# Patient Record
Sex: MALE | Race: WHITE | Employment: FULL TIME | URBAN - METROPOLITAN AREA
[De-identification: names, ages, dates, MRNs, and addresses within clinical notes are randomized per-mention and may not be internally consistent; named-entity substitution may affect disease eponyms.]

---

## 2016-07-20 LAB — COLONOSCOPY, EXTERNAL: NORMAL

## 2017-03-27 ENCOUNTER — OFFICE VISIT (OUTPATIENT)
Dept: INTERNAL MEDICINE CLINIC | Age: 52
End: 2017-03-27

## 2017-03-27 VITALS
DIASTOLIC BLOOD PRESSURE: 89 MMHG | SYSTOLIC BLOOD PRESSURE: 129 MMHG | HEART RATE: 95 BPM | TEMPERATURE: 97.9 F | OXYGEN SATURATION: 95 % | BODY MASS INDEX: 30.93 KG/M2 | HEIGHT: 74 IN | WEIGHT: 241 LBS

## 2017-03-27 DIAGNOSIS — R07.9 CHEST PAIN IN ADULT: ICD-10-CM

## 2017-03-27 DIAGNOSIS — R00.2 HEART PALPITATIONS: ICD-10-CM

## 2017-03-27 DIAGNOSIS — I10 ESSENTIAL HYPERTENSION: Primary | ICD-10-CM

## 2017-03-27 DIAGNOSIS — M62.08 RECTUS DIASTASIS: ICD-10-CM

## 2017-03-27 RX ORDER — TIZANIDINE HYDROCHLORIDE 4 MG/1
4 CAPSULE, GELATIN COATED ORAL 3 TIMES DAILY
COMMUNITY
End: 2018-03-29

## 2017-03-27 NOTE — PROGRESS NOTES
HISTORY OF PRESENT ILLNESS  William Perdue is a 45 y.o. male. 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  metoprolol succinate (TOPROL-XL) 25 mg XL tablet Take two 25 mg tablets daily 180 Tab 3    amLODIPine (NORVASC) 5 mg tablet Take 1 Tab by mouth daily. 90 Tab 3    LORazepam (ATIVAN) 0.5 mg tablet Take 1 Tab by mouth every four (4) hours as needed (chest pain). Max Daily Amount: 3 mg. 60 Tab 1    acetaminophen (TYLENOL EXTRA STRENGTH) 500 mg tablet Take  by mouth every six (6) hours as needed for Pain.  calcium carbonate (TUMS) 200 mg calcium (500 mg) chew Take 1 Tab by mouth as needed.  simethicone (MYLICON) 80 mg chewable tablet Take 80 mg by mouth every six (6) hours as needed for Flatulence. Allergies   Allergen Reactions    Iodinated Contrast Media - Oral And Iv Dye Other (comments)     \"resp reaction\"    Pcn [Penicillins] Unknown (comments)    Stings [Sting, Bee] Swelling           ROS    Physical Exam   Constitutional: He appears well-developed and well-nourished. No distress. Appears stated age   HENT:   Head: Normocephalic. Cardiovascular: Normal rate and regular rhythm. Exam reveals no gallop and no friction rub.     No murmur heard.  Pulmonary/Chest: Effort normal and breath sounds normal.   Abdominal: Soft. Rectus diastasis with valsalva   Musculoskeletal: He exhibits no edema. Neurological: He is alert. Psychiatric: He has a normal mood and affect. Nursing note and vitals reviewed. ASSESSMENT and PLAN  Asuncion Ortiz was seen today for hypertension, palpitations and abdominal pain. Diagnoses and all orders for this visit:    Essential hypertension   Controlled, continue medicines    Chest pain in adult   Possible d/t to rectus problem   To see surgeon this week ? Surgery vs work limitation   May need FMLA?     Rectus diastasis    Heart palpitations   Controlled on toprol    Screening   Declines PSA and Hep C screening today but will consider at next visit in 6 months      Follow-up Disposition:  Return in about 6 months (around 9/27/2017) for chest pain, upper abdomen pain, htn palpitaions, PSA.

## 2017-03-27 NOTE — MR AVS SNAPSHOT
Visit Information Date & Time Provider Department Dept. Phone Encounter #  
 3/27/2017 11:30 AM Jessica Lowery, [address] [phone] [id number] Follow-up Instructions Return in about 6 months (around 9/27/2017) for chest pain, upper abdomen pain, htn palpitaions, PSA. Your Appointments 3/31/2017  9:00 AM  
New Patient with Margaret Strickland MD  
[facility] Dr. Erickson Gonzalez ([facility]) Appt Note: hernia, healthkeepers yts open access/Dr. Bruno Liu, Notes in cc. Patient will be coming from Dr. Susana valadez's; .  
 [address] [place] [address], [facility] [facility], [address] [phone]  
[address] [facility], [address] [facility] [address] [phone] Upcoming Health Maintenance Date Due Hepatitis C Screening 1965 DTaP/Tdap/Td series (1 - Tdap) 1/7/1984 INFLUENZA AGE 9 TO ADULT 8/1/2016 COLONOSCOPY 7/20/2026 Allergies as of 3/27/2017  Review Complete On: 3/27/2017 By: Ana Collins LPN Severity Noted Reaction Type Reactions Iodinated Contrast Media - Oral And Iv Dye High 03/24/2016    Other (comments) \"resp reaction\" Pcn [Penicillins]  04/09/2015    Unknown (comments) Stings [Sting, Bee]  04/19/2016    Swelling Current Immunizations  Never Reviewed No immunizations on file. Not reviewed this visit You Were Diagnosed With   
  
 Codes Comments Essential hypertension    -  Primary ICD-10-CM: I10 
ICD-9-CM: 401.9 Chest pain in adult     ICD-10-CM: R07.9 ICD-9-CM: 786.50 Rectus diastasis     ICD-10-CM: M62.08 
ICD-9-CM: 728.84 Heart palpitations     ICD-10-CM: R00.2 ICD-9-CM: 785.1 Vitals BP Pulse Temp Height(growth percentile) Weight(growth percentile) SpO2  
 129/89 (BP 1 Location: Left arm, BP Patient Position: Sitting) 95 97.9 °F (36.6 °C) (Oral) 6' 2\" (1.88 m) 241 lb (109.3 kg) 95% BMI Smoking Status 30.94 kg/m2 Former Smoker BMI and BSA Data Body Mass Index Body Surface Area 30.94 kg/m 2 2.39 m 2 Preferred Pharmacy Pharmacy Name Phone [facility] [address] [facility] [phone] Your Updated Medication List  
  
   
This list is accurate as of: 3/27/17 12:13 PM.  Always use your most recent med list. amLODIPine 5 mg tablet Commonly known as:  Voncile Gambrills Take 1 Tab by mouth daily. calcium carbonate 200 mg calcium (500 mg) Chew Commonly known as:  TUMS Take 1 Tab by mouth as needed. LORazepam 0.5 mg tablet Commonly known as:  ATIVAN Take 1 Tab by mouth every four (4) hours as needed (chest pain). Max Daily Amount: 3 mg.  
  
 metoprolol succinate 25 mg XL tablet Commonly known as:  TOPROL-XL Take two 25 mg tablets daily  
  
 simethicone 80 mg chewable tablet Commonly known as:  Honora Nader Take 80 mg by mouth every six (6) hours as needed for Flatulence. tiZANidine 4 mg capsule Commonly known as:  Stephanie Chaudhry Take 4 mg by mouth three (3) times daily. TYLENOL EXTRA STRENGTH 500 mg tablet Generic drug:  acetaminophen Take  by mouth every six (6) hours as needed for Pain. Follow-up Instructions Return in about 6 months (around 9/27/2017) for chest pain, upper abdomen pain, htn palpitaions, PSA. Introducing \[facility]\"" & HEALTH SERVICES! Dear Seth Reyna: Thank you for requesting a duuin account. Our records indicate that you already have an active duuin account. You can access your account anytime at [email or web address]/Azteq Mobile Did you know that you can access your hospital and ER discharge instructions at any time in duuin? You can also review all of your test results from your hospital stay or ER visit. Additional Information If you have questions, please visit the Frequently Asked Questions section of the [employer] website at [email or web address] Remember, [employer] is NOT to be used for urgent needs. For medical emergencies, dial 911. Now available from your iPhone and Android! Please provide this summary of care documentation to your next provider. Your primary care clinician is listed as Niurka TEJEDA. If you have any questions after today's visit, please call [phone].

## 2017-03-31 ENCOUNTER — OFFICE VISIT (OUTPATIENT)
Dept: SURGERY | Age: 52
End: 2017-03-31

## 2017-03-31 VITALS
BODY MASS INDEX: 30.74 KG/M2 | DIASTOLIC BLOOD PRESSURE: 77 MMHG | HEART RATE: 78 BPM | SYSTOLIC BLOOD PRESSURE: 132 MMHG | OXYGEN SATURATION: 96 % | HEIGHT: 74 IN | WEIGHT: 239.5 LBS

## 2017-03-31 DIAGNOSIS — M62.08 DIASTASIS RECTI: Primary | ICD-10-CM

## 2017-03-31 DIAGNOSIS — R07.9 CHEST PAIN IN ADULT: ICD-10-CM

## 2017-03-31 NOTE — MR AVS SNAPSHOT
Visit Information Date & Time Provider Department Dept. Phone Encounter #  
 3/31/2017  9:00 AM Sharee Valiente MD [facility] [id number] Your Appointments 9/28/2017  3:00 PM  
ROUTINE CARE with Mitzi Ruggiero, [address] [place] [address],[address] [facility]) Appt Note: 6 month follow up  
 [address] [facility] [address] [phone]  
[address] [facility] [address] [place] [address] Upcoming Health Maintenance Date Due Hepatitis C Screening 1965 DTaP/Tdap/Td series (1 - Tdap) 1/7/1984 INFLUENZA AGE 9 TO ADULT 8/1/2016 COLONOSCOPY 7/20/2026 Allergies as of 3/31/2017  Review Complete On: 3/31/2017 By: Sharee Valiente MD  
  
 Severity Noted Reaction Type Reactions Iodinated Contrast Media - Oral And Iv Dye High 03/24/2016    Other (comments) \"resp reaction\" Pcn [Penicillins]  04/09/2015    Unknown (comments) Stings [Sting, Bee]  04/19/2016    Swelling Current Immunizations  Never Reviewed No immunizations on file. Not reviewed this visit Vitals BP Pulse Height(growth percentile) Weight(growth percentile) SpO2 BMI  
 132/77 (BP 1 Location: Right arm, BP Patient Position: Sitting) 78 6' 2\" (1.88 m) 239 lb 8 oz (108.6 kg) 96% 30.75 kg/m2 Smoking Status Former Smoker BMI and BSA Data Body Mass Index Body Surface Area 30.75 kg/m 2 2.38 m 2 Preferred Pharmacy Pharmacy Name Phone [address] [phone] Your Updated Medication List  
  
   
This list is accurate as of: 3/31/17  1:50 PM.  Always use your most recent med list. amLODIPine 5 mg tablet Commonly known as:  Madalyn Fraction Take 1 Tab by mouth daily. calcium carbonate 200 mg calcium (500 mg) Chew Commonly known as:  TUMS Take 1 Tab by mouth as needed. LORazepam 0.5 mg tablet Commonly known as:  ATIVAN Take 1 Tab by mouth every four (4) hours as needed (chest pain). Max Daily Amount: 3 mg.  
  
 metoprolol succinate 25 mg XL tablet Commonly known as:  TOPROL-XL Take two 25 mg tablets daily  
  
 simethicone 80 mg chewable tablet Commonly known as:  Alfrieda Rodríguez Take 80 mg by mouth every six (6) hours as needed for Flatulence. tiZANidine 4 mg capsule Commonly known as:  [place] Sleek Take 4 mg by mouth three (3) times daily. TYLENOL EXTRA STRENGTH 500 mg tablet Generic drug:  acetaminophen Take  by mouth every six (6) hours as needed for Pain. Introducing [facility] & HEALTH SERVICES! Dear Zuleyma Loza: Thank you for requesting a [employer] account. Our records indicate that you already have an active [employer] account. You can access your account anytime at [email or web address] Did you know that you can access your hospital and ER discharge instructions at any time in [employer] Education? You can also review all of your test results from your hospital stay or ER visit. Additional Information If you have questions, please visit the Frequently Asked Questions section of the [employer] website at [email or web address] Remember, [employer] is NOT to be used for urgent needs. For medical emergencies, dial 911. Now available from your iPhone and Android! Please provide this summary of care documentation to your next provider. Your primary care clinician is listed as Sergio TEJEDA. If you have any questions after today's visit, please call [phone].

## 2017-03-31 NOTE — PROGRESS NOTES
HISTORY OF PRESENT ILLNESS  Xin Bennett is a 45 y.o. male who comes in for consultation by Norman Lauren MD for a upper abdominal/chest wall pain  HPI  He has had intermittent epigastric/sternal pain associated with palpitations for about 10 months. He has been to the ER on 3 occassions. He has had extensive cardiac work up, GI (EGD/colon/US), rheumatology work up which were unrevealing except for some mild duodenitis. He has also had esophageal manometry which was negative. He was told he has a hernia and that it may be the source of his symptoms. He does heavy lifting at work which aggravates the symptoms. It is not related to eating, only with movement/lifting/straining. He denies nausea, vomiting, diarrhea, constipation, melena, hematochezia, dysuria, hematuria. He has not had abdominal surgery previously. Past Medical History:   Diagnosis Date    Cerebral aneurysm     s/p clippping    Hypertension      Past Surgical History:   Procedure Laterality Date    COLONOSCOPY N/A 7/20/2016    COLONOSCOPY performed by Valencia August [facility] at [facility] ENDOSCOPY    HX OTHER SURGICAL      cerebral anerysm clipping 1994 and 1995     Family History   Problem Relation Age of Onset    Heart Disease Father      CHB pacemaker     Social History   Substance Use Topics    Smoking status: Former Smoker     Packs/day: 1.50     Years: 30.00     Types: Cigarettes     Quit date: 3/10/2016    Smokeless tobacco: Never Used    Alcohol use Yes      Comment: rarely     Current Outpatient Prescriptions   Medication Sig    tiZANidine (ZANAFLEX) 4 mg capsule Take 4 mg by mouth three (3) times daily.  metoprolol succinate (TOPROL-XL) 25 mg XL tablet Take two 25 mg tablets daily    amLODIPine (NORVASC) 5 mg tablet Take 1 Tab by mouth daily.  LORazepam (ATIVAN) 0.5 mg tablet Take 1 Tab by mouth every four (4) hours as needed (chest pain). Max Daily Amount: 3 mg.     acetaminophen (TYLENOL EXTRA STRENGTH) 500 mg tablet Take  by mouth every six (6) hours as needed for Pain.  calcium carbonate (TUMS) 200 mg calcium (500 mg) chew Take 1 Tab by mouth as needed.  simethicone (MYLICON) 80 mg chewable tablet Take 80 mg by mouth every six (6) hours as needed for Flatulence. No current facility-administered medications for this visit. Allergies   Allergen Reactions    Iodinated Contrast Media - Oral And Iv Dye Other (comments)     \"resp reaction\"    Pcn [Penicillins] Unknown (comments)    Stings [Sting, Bee] Swelling       Review of Systems   Constitutional: Negative for chills, diaphoresis, fever, malaise/fatigue and weight loss. HENT: Negative for congestion, ear pain and sore throat. Eyes: Negative for blurred vision and pain. Respiratory: Negative for cough, hemoptysis, sputum production, shortness of breath, wheezing and stridor. Cardiovascular: Positive for chest pain. Negative for palpitations, orthopnea, claudication, leg swelling and PND. Gastrointestinal: Positive for abdominal pain. Negative for blood in stool, constipation, diarrhea, heartburn, melena, nausea and vomiting. Genitourinary: Negative for dysuria, flank pain, frequency, hematuria and urgency. Musculoskeletal: Negative for back pain, joint pain, myalgias and neck pain. Skin: Negative for itching and rash. Neurological: Negative for dizziness, tremors, focal weakness, seizures, weakness and headaches. Endo/Heme/Allergies: Negative for polydipsia. Psychiatric/Behavioral: Negative for depression and memory loss. The patient is not nervous/anxious. Visit Vitals    /77 (BP 1 Location: Right arm, BP Patient Position: Sitting)    Pulse 78    Ht 6' 2\" (1.88 m)    Wt 108.6 kg (239 lb 8 oz)    SpO2 96%    BMI 30.75 kg/m2       Physical Exam   Constitutional: He is oriented to person, place, and time. He appears well-developed and well-nourished. No distress. HENT:   Head: Normocephalic and atraumatic.    Mouth/Throat: Oropharynx is clear and moist. No oropharyngeal exudate. Eyes: Conjunctivae and EOM are normal. Pupils are equal, round, and reactive to light. No scleral icterus. Neck: Normal range of motion. Neck supple. No tracheal deviation present. No thyromegaly present. Cardiovascular: Normal rate, regular rhythm and normal heart sounds. Exam reveals no gallop and no friction rub. No murmur heard. Pulmonary/Chest: Effort normal and breath sounds normal. No stridor. No respiratory distress. He has no wheezes. He has no rales. Abdominal: Soft. Normal appearance and bowel sounds are normal. He exhibits no distension, no pulsatile liver and no mass. There is no hepatosplenomegaly. There is no tenderness. There is no rebound, no guarding, no CVA tenderness, no tenderness at McBurney's point and negative Betts's sign. No hernia. Hernia confirmed negative in the ventral area, confirmed negative in the right inguinal area and confirmed negative in the left inguinal area. Genitourinary: Testes normal. Cremasteric reflex is present. Circumcised. Musculoskeletal: Normal range of motion. He exhibits no edema or tenderness. Lymphadenopathy:     He has no cervical adenopathy. Right: No inguinal adenopathy present. Left: No inguinal adenopathy present. Neurological: He is alert and oriented to person, place, and time. No cranial nerve deficit. Coordination normal.   Skin: Skin is warm and dry. No rash noted. He is not diaphoretic. No erythema. Psychiatric: He has a normal mood and affect. His behavior is normal. Judgment and thought content normal.       ASSESSMENT and PLAN  1. Atypical chest/upper abdominal pain. The pain appears musculoskeletal in nature. Consider wearing a binder with activity  2. Rectus diastasis. I explained the anatomy and pathophysiology of the process and risks for future hernia but that it is unlikely to be the source of #1. It is primarily cosmetic.     RTC prn

## 2017-09-28 ENCOUNTER — OFFICE VISIT (OUTPATIENT)
Dept: INTERNAL MEDICINE CLINIC | Age: 52
End: 2017-09-28

## 2017-09-28 VITALS
TEMPERATURE: 98.4 F | HEART RATE: 94 BPM | SYSTOLIC BLOOD PRESSURE: 138 MMHG | WEIGHT: 247 LBS | DIASTOLIC BLOOD PRESSURE: 80 MMHG | BODY MASS INDEX: 31.7 KG/M2 | HEIGHT: 74 IN | OXYGEN SATURATION: 94 %

## 2017-09-28 DIAGNOSIS — I10 ESSENTIAL HYPERTENSION: ICD-10-CM

## 2017-09-28 DIAGNOSIS — Z12.5 PROSTATE CANCER SCREENING: ICD-10-CM

## 2017-09-28 DIAGNOSIS — M62.08 DIASTASIS RECTI: ICD-10-CM

## 2017-09-28 DIAGNOSIS — F41.9 ANXIETY: Primary | ICD-10-CM

## 2017-09-28 DIAGNOSIS — R10.9 ABDOMINAL PAIN, UNSPECIFIED LOCATION: ICD-10-CM

## 2017-09-28 RX ORDER — AMLODIPINE BESYLATE 5 MG/1
5 TABLET ORAL DAILY
Qty: 90 TAB | Refills: 3 | Status: SHIPPED | OUTPATIENT
Start: 2017-09-28 | End: 2018-10-05 | Stop reason: SDUPTHER

## 2017-09-28 RX ORDER — RANITIDINE 150 MG/1
150 TABLET, FILM COATED ORAL 2 TIMES DAILY
COMMUNITY
End: 2018-10-05 | Stop reason: SDUPTHER

## 2017-09-28 RX ORDER — METOPROLOL SUCCINATE 25 MG/1
TABLET, EXTENDED RELEASE ORAL
Qty: 180 TAB | Refills: 3 | Status: SHIPPED | OUTPATIENT
Start: 2017-09-28 | End: 2018-10-05 | Stop reason: SDUPTHER

## 2017-09-28 RX ORDER — LORAZEPAM 0.5 MG/1
0.5 TABLET ORAL
Qty: 60 TAB | Refills: 1 | Status: SHIPPED | OUTPATIENT
Start: 2017-09-28 | End: 2022-03-29 | Stop reason: ALTCHOICE

## 2017-09-28 NOTE — PROGRESS NOTES
HISTORY OF PRESENT ILLNESS  Dave Goel is a 45 y.o. male. HPI   F/u anxiety, palpitation, htn , rectus diastasis  Still has abdominal discomfort at work from lifting and bending which causes CP/abd pain and then anxiety  Takes one ativan every day at work ( 5 tabs per week)  Saw general surgeon who did not advise surgery for the rectus diastasis    Patient Active Problem List    Diagnosis Date Noted    Diastasis recti 03/31/2017    Essential hypertension 10/18/2016    Chest pain in adult 10/18/2016    Thyroid nodule 10/04/2016    Heart palpitations 03/24/2016     Current Outpatient Prescriptions   Medication Sig Dispense Refill    raNITIdine (ZANTAC) 150 mg tablet Take 150 mg by mouth two (2) times a day.  LORazepam (ATIVAN) 0.5 mg tablet Take 1 Tab by mouth every four (4) hours as needed (chest pain). Max Daily Amount: 3 mg. 60 Tab 1    amLODIPine (NORVASC) 5 mg tablet Take 1 Tab by mouth daily. 90 Tab 3    metoprolol succinate (TOPROL-XL) 25 mg XL tablet Take two 25 mg tablets daily 180 Tab 3    tiZANidine (ZANAFLEX) 4 mg capsule Take 4 mg by mouth three (3) times daily.  acetaminophen (TYLENOL EXTRA STRENGTH) 500 mg tablet Take  by mouth every six (6) hours as needed for Pain.  calcium carbonate (TUMS) 200 mg calcium (500 mg) chew Take 1 Tab by mouth as needed.  simethicone (MYLICON) 80 mg chewable tablet Take 80 mg by mouth every six (6) hours as needed for Flatulence.        Allergies   Allergen Reactions    Iodinated Contrast- Oral And Iv Dye Other (comments)     \"resp reaction\"    Pcn [Penicillins] Unknown (comments)    Stings [Sting, Bee] Swelling     Social History   Substance Use Topics    Smoking status: Former Smoker     Packs/day: 1.50     Years: 30.00     Types: Cigarettes     Quit date: 3/10/2016    Smokeless tobacco: Never Used    Alcohol use Yes      Comment: rarely      Lab Results  Component Value Date/Time   WBC 11.1 09/23/2016 04:40 PM   HGB 15.1 09/23/2016 04:40 PM   HCT 43.5 09/23/2016 04:40 PM   PLATELET 166 00/61/9010 04:40 PM   MCV 91.2 09/23/2016 04:40 PM     Lab Results  Component Value Date/Time   Glucose 91 09/23/2016 04:40 PM   Creatinine 0.98 09/23/2016 04:40 PM      No results found for: CHOL, CHOLPOCT, HDL, LDL, LDLC, LDLCPOC, LDLCEXT, TRIGL, TGLPOCT, CHHD, CHHDXLab Results  Component Value Date/Time   GFR est non-AA >60 09/23/2016 04:40 PM   GFR est AA >60 09/23/2016 04:40 PM   Creatinine 0.98 09/23/2016 04:40 PM   BUN 16 09/23/2016 04:40 PM   Sodium 141 09/23/2016 04:40 PM   Potassium 3.5 09/23/2016 04:40 PM   Chloride 108 09/23/2016 04:40 PM   CO2 22 09/23/2016 04:40 PM   Magnesium 2.0 09/23/2016 04:40 PM              ROS    Physical Exam   Constitutional: He appears well-developed and well-nourished. No distress. Appears stated age   HENT:   Head: Normocephalic. Cardiovascular: Normal rate, regular rhythm and normal heart sounds. Exam reveals no gallop and no friction rub. No murmur heard. Pulmonary/Chest: Effort normal and breath sounds normal. No respiratory distress. He has no wheezes. He has no rales. He exhibits no tenderness. Abdominal: Soft. Musculoskeletal: He exhibits no edema. Neurological: He is alert. Psychiatric: He has a normal mood and affect. Nursing note and vitals reviewed. ASSESSMENT and PLAN  Diagnoses and all orders for this visit:    1. Anxiety   Stable, refilled ativan  2. Abdominal pain, unspecified location   Stable, occurs mostly at work-using binder at work now  3. Essential hypertension  -     METABOLIC PANEL, BASIC  -     LIPID PANEL  -     CBC W/O DIFF   Reasonable control  4. Prostate cancer screening  -     PROSTATE SPECIFIC AG    5. Diastasis recti   Stable, wearing abdominal binder at work now  Other orders  -     LORazepam (ATIVAN) 0.5 mg tablet; Take 1 Tab by mouth every four (4) hours as needed (chest pain). Max Daily Amount: 3 mg.  -     amLODIPine (NORVASC) 5 mg tablet;  Take 1 Tab by mouth daily.  -     metoprolol succinate (TOPROL-XL) 25 mg XL tablet; Take two 25 mg tablets daily      Follow-up Disposition:  Return in about 6 months (around 3/28/2018) for htn anxiety.

## 2017-09-28 NOTE — MR AVS SNAPSHOT
Visit Information Date & Time Provider Department Dept. Phone Encounter #  
 9/28/2017  3:00 PM Carlos Miller, [address] [place] [address],[address] [phone] [id number] Follow-up Instructions Return in about 6 months (around 3/28/2018) for htn anxiety. Upcoming Health Maintenance Date Due Hepatitis C Screening 1965 DTaP/Tdap/Td series (1 - Tdap) 1/7/1984 INFLUENZA AGE 9 TO ADULT 8/1/2017 COLONOSCOPY 7/20/2026 Allergies as of 9/28/2017  Review Complete On: 9/28/2017 By: Carlos Miller MD  
  
 Severity Noted Reaction Type Reactions Iodinated Contrast- Oral And Iv Dye High 03/24/2016    Other (comments) \"resp reaction\" Pcn [Penicillins]  04/09/2015    Unknown (comments) Stings [Sting, Bee]  04/19/2016    Swelling Current Immunizations  Never Reviewed No immunizations on file. Not reviewed this visit You Were Diagnosed With   
  
 Codes Comments Anxiety    -  Primary ICD-10-CM: F41.9 ICD-9-CM: 300.00 Abdominal pain, unspecified location     ICD-10-CM: R10.9 ICD-9-CM: 789.00 Essential hypertension     ICD-10-CM: I10 
ICD-9-CM: 401.9 Prostate cancer screening     ICD-10-CM: Z12.5 ICD-9-CM: V76.44 Diastasis recti     ICD-10-CM: M62.08 
ICD-9-CM: 728.84 Vitals BP Pulse Temp Height(growth percentile) Weight(growth percentile) SpO2  
 138/80 94 98.4 °F (36.9 °C) (Oral) 6' 2\" (1.88 m) 247 lb (112 kg) 94% BMI Smoking Status 31.71 kg/m2 Former Smoker Vitals History BMI and BSA Data Body Mass Index Body Surface Area 31.71 kg/m 2 2.42 m 2 Preferred Pharmacy Pharmacy Name Phone [address] [phone] Your Updated Medication List  
  
   
This list is accurate as of: 9/28/17  4:07 PM.  Always use your most recent med list. amLODIPine 5 mg tablet Commonly known as:  Claudia Cardona Take 1 Tab by mouth daily. calcium carbonate 200 mg calcium (500 mg) Chew Commonly known as:  TUMS Take 1 Tab by mouth as needed. LORazepam 0.5 mg tablet Commonly known as:  ATIVAN Take 1 Tab by mouth every four (4) hours as needed (chest pain). Max Daily Amount: 3 mg.  
  
 metoprolol succinate 25 mg XL tablet Commonly known as:  TOPROL-XL Take two 25 mg tablets daily  
  
 simethicone 80 mg chewable tablet Commonly known as:  Sheral Alexandra Take 80 mg by mouth every six (6) hours as needed for Flatulence. tiZANidine 4 mg capsule Commonly known as:  Hosmeche Munda Take 4 mg by mouth three (3) times daily. TYLENOL EXTRA STRENGTH 500 mg tablet Generic drug:  acetaminophen Take  by mouth every six (6) hours as needed for Pain. ZANTAC 150 mg tablet Generic drug:  raNITIdine Take 150 mg by mouth two (2) times a day. Prescriptions Printed Refills LORazepam (ATIVAN) 0.5 mg tablet 1 Sig: Take 1 Tab by mouth every four (4) hours as needed (chest pain). Max Daily Amount: 3 mg. Class: Print Route: Oral  
  
Prescriptions Sent to Pharmacy Refills  
 amLODIPine (NORVASC) 5 mg tablet 3 Sig: Take 1 Tab by mouth daily. Class: Normal  
 Pharmacy: [address] Ph #: [phone] Route: Oral  
 metoprolol succinate (TOPROL-XL) 25 mg XL tablet 3 Sig: Take two 25 mg tablets daily Class: Normal  
 Pharmacy: [address] Ph #: [phone] We Performed the Following CBC W/O DIFF [35205 CPT(R)] LIPID PANEL [01166 CPT(R)] METABOLIC PANEL, BASIC [72146 CPT(R)] PSA, DIAGNOSTIC (PROSTATE SPECIFIC AG) [id number] CPT(R)] Follow-up Instructions Return in about 6 months (around 3/28/2018) for htn anxiety. Introducing [facility] & HEALTH SERVICES! Dear Shruthi Britton: Thank you for requesting a AdFinance account. Our records indicate that you already have an active AdFinance account. You can access your account anytime at [email or web address] Did you know that you can access your hospital and ER discharge instructions at any time in AdFinance? You can also review all of your test results from your hospital stay or ER visit. Additional Information If you have questions, please visit the Frequently Asked Questions section of the AdFinance website at [email or web address] Remember, AdFinance is NOT to be used for urgent needs. For medical emergencies, dial 911. Now available from your iPhone and Android! Please provide this summary of care documentation to your next provider. Your primary care clinician is listed as David TEJEDA. If you have any questions after today's visit, please call [phone].

## 2017-09-29 LAB
BUN SERPL-MCNC: 17 MG/DL (ref 6–24)
BUN/CREAT SERPL: 18 (ref 9–20)
CALCIUM SERPL-MCNC: 9.7 MG/DL (ref 8.7–10.2)
CHLORIDE SERPL-SCNC: 105 MMOL/L (ref 96–106)
CHOLEST SERPL-MCNC: 207 MG/DL (ref 100–199)
CO2 SERPL-SCNC: 21 MMOL/L (ref 18–29)
CREAT SERPL-MCNC: 0.97 MG/DL (ref 0.76–1.27)
ERYTHROCYTE [DISTWIDTH] IN BLOOD BY AUTOMATED COUNT: 13.6 % (ref 12.3–15.4)
GLUCOSE SERPL-MCNC: 96 MG/DL (ref 65–99)
HCT VFR BLD AUTO: 43.8 % (ref 37.5–51)
HDLC SERPL-MCNC: 40 MG/DL
HGB BLD-MCNC: 15.2 G/DL (ref 12.6–17.7)
LDLC SERPL CALC-MCNC: 135 MG/DL (ref 0–99)
MCH RBC QN AUTO: 31.8 PG (ref 26.6–33)
MCHC RBC AUTO-ENTMCNC: 34.7 G/DL (ref 31.5–35.7)
MCV RBC AUTO: 92 FL (ref 79–97)
PLATELET # BLD AUTO: 322 X10E3/UL (ref 150–379)
POTASSIUM SERPL-SCNC: 4.6 MMOL/L (ref 3.5–5.2)
PSA SERPL-MCNC: 0.9 NG/ML (ref 0–4)
RBC # BLD AUTO: 4.78 X10E6/UL (ref 4.14–5.8)
SODIUM SERPL-SCNC: 144 MMOL/L (ref 134–144)
TRIGL SERPL-MCNC: 161 MG/DL (ref 0–149)
VLDLC SERPL CALC-MCNC: 32 MG/DL (ref 5–40)
WBC # BLD AUTO: 11.5 X10E3/UL (ref 3.4–10.8)

## 2017-10-03 ENCOUNTER — TELEPHONE (OUTPATIENT)
Dept: INTERNAL MEDICINE CLINIC | Age: 52
End: 2017-10-03

## 2017-10-13 ENCOUNTER — TELEPHONE (OUTPATIENT)
Dept: INTERNAL MEDICINE CLINIC | Age: 52
End: 2017-10-13

## 2018-03-29 ENCOUNTER — OFFICE VISIT (OUTPATIENT)
Dept: INTERNAL MEDICINE CLINIC | Age: 53
End: 2018-03-29

## 2018-03-29 VITALS
HEIGHT: 74 IN | WEIGHT: 251 LBS | SYSTOLIC BLOOD PRESSURE: 140 MMHG | HEART RATE: 87 BPM | BODY MASS INDEX: 32.21 KG/M2 | OXYGEN SATURATION: 95 % | TEMPERATURE: 98.4 F | DIASTOLIC BLOOD PRESSURE: 84 MMHG

## 2018-03-29 DIAGNOSIS — M62.08 RECTUS DIASTASIS: ICD-10-CM

## 2018-03-29 DIAGNOSIS — I10 ESSENTIAL HYPERTENSION: Primary | ICD-10-CM

## 2018-03-29 RX ORDER — CYCLOBENZAPRINE HCL 5 MG
5 TABLET ORAL
Qty: 60 TAB | Refills: 5 | Status: SHIPPED | OUTPATIENT
Start: 2018-03-29 | End: 2018-10-05

## 2018-03-29 NOTE — PROGRESS NOTES
HISTORY OF PRESENT ILLNESS  Valentina Martin is a 52 y.o. male. 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 Asking about a testosterone level in the future    Patient Active Problem List    Diagnosis Date Noted    Diastasis recti 03/31/2017    Essential hypertension 10/18/2016    Chest pain in adult 10/18/2016    Thyroid nodule 10/04/2016    Heart palpitations 03/24/2016     Current Outpatient Prescriptions   Medication Sig Dispense Refill    cyclobenzaprine (FLEXERIL) 5 mg tablet Take 1 Tab by mouth three (3) times daily as needed for Muscle Spasm(s). 60 Tab 5    raNITIdine (ZANTAC) 150 mg tablet Take 150 mg by mouth two (2) times a day.  LORazepam (ATIVAN) 0.5 mg tablet Take 1 Tab by mouth every four (4) hours as needed (chest pain). Max Daily Amount: 3 mg. 60 Tab 1    amLODIPine (NORVASC) 5 mg tablet Take 1 Tab by mouth daily. 90 Tab 3    metoprolol succinate (TOPROL-XL) 25 mg XL tablet Take two 25 mg tablets daily 180 Tab 3    acetaminophen (TYLENOL EXTRA STRENGTH) 500 mg tablet Take  by mouth every six (6) hours as needed for Pain.  calcium carbonate (TUMS) 200 mg calcium (500 mg) chew Take 1 Tab by mouth as needed.  simethicone (MYLICON) 80 mg chewable tablet Take 80 mg by mouth every six (6) hours as needed for Flatulence.        Allergies   Allergen Reactions    Iodinated Contrast- Oral And Iv Dye Other (comments)     \"resp reaction\"    Pcn [Penicillins] Unknown (comments)    Stings [Sting, Bee] Swelling      Lab Results  Component Value Date/Time   Glucose 96 09/28/2017 04:17 PM   LDL, calculated 135 (H) 09/28/2017 04:17 PM   Creatinine 0.97 09/28/2017 04:17 PM      Lab Results  Component Value Date/Time   Cholesterol, total 207 (H) 09/28/2017 04:17 PM   HDL Cholesterol 40 09/28/2017 04:17 PM   LDL, calculated 135 (H) 09/28/2017 04:17 PM   Triglyceride 161 (H) 09/28/2017 04:17 PM     Lab Results  Component Value Date/Time   GFR est non-AA 88 09/28/2017 04:17 PM   GFR est  09/28/2017 04:17 PM   Creatinine 0.97 09/28/2017 04:17 PM   BUN 17 09/28/2017 04:17 PM   Sodium 144 09/28/2017 04:17 PM   Potassium 4.6 09/28/2017 04:17 PM   Chloride 105 09/28/2017 04:17 PM   CO2 21 09/28/2017 04:17 PM   Magnesium 2.0 09/23/2016 04:40 PM        ROS    Physical Exam   Constitutional: He appears well-developed and well-nourished. No distress. Appears stated age   HENT:   Head: Normocephalic. Cardiovascular: Normal rate, regular rhythm and normal heart sounds. Exam reveals no gallop and no friction rub. No murmur heard. Pulmonary/Chest: Effort normal and breath sounds normal. No respiratory distress. He has no wheezes. He has no rales. He exhibits no tenderness. Abdominal: Soft. Rectus diastasis   Musculoskeletal: He exhibits no edema. Neurological: He is alert. Psychiatric: He has a normal mood and affect. Nursing note and vitals reviewed. ASSESSMENT and PLAN  Diagnoses and all orders for this visit:    1. Essential hypertension   Reasonable control   Continue medicines  2. Rectus diastasis   Stable, pt tries to avoid excessive heavy lifting   Flexeril prn pain 60 plus refills   Zantac bid  Other orders  -     cyclobenzaprine (FLEXERIL) 5 mg tablet; Take 1 Tab by mouth three (3) times daily as needed for Muscle Spasm(s). Follow-up Disposition:  Return in about 6 months (around 9/29/2018) for CPE x 30 min--.

## 2018-03-29 NOTE — MR AVS SNAPSHOT
[address] 
[phone] Patient: Loreto Madrid MRN: [id number] :[id number] Visit Information Date & Time Provider Department Dept. Phone Encounter #  
 3/29/2018  3:00 PM Michial Needs, [address] [place] [address],[address] [phone] [id number] Follow-up Instructions Return in about 6 months (around 2018) for CPE x 30 min--. Upcoming Health Maintenance Date Due Hepatitis C Screening 1965 DTaP/Tdap/Td series (1 - Tdap) 1984 COLONOSCOPY 2026 Allergies as of 3/29/2018  Review Complete On: 3/29/2018 By: Jamel Garner LPN Severity Noted Reaction Type Reactions Iodinated Contrast- Oral And Iv Dye High 2016    Other (comments) \"resp reaction\" Pcn [Penicillins]  2015    Unknown (comments) Stings [Sting, Bee]  2016    Swelling Current Immunizations  Never Reviewed No immunizations on file. Not reviewed this visit You Were Diagnosed With   
  
 Codes Comments Essential hypertension    -  Primary ICD-10-CM: I10 
ICD-9-CM: 401.9 Rectus diastasis     ICD-10-CM: M62.08 
ICD-9-CM: 728.84 Vitals BP Pulse Temp Height(growth percentile) Weight(growth percentile) SpO2  
 140/84 87 98.4 °F (36.9 °C) (Oral) 6' 2\" (1.88 m) 251 lb (113.9 kg) 95% BMI Smoking Status 32.23 kg/m2 Former Smoker Vitals History BMI and BSA Data Body Mass Index Body Surface Area  
 32.23 kg/m 2 2.44 m 2 Preferred Pharmacy Pharmacy Name Phone [address] [place] [address] [phone] Your Updated Medication List  
  
   
This list is accurate as of 3/29/18  3:50 PM.  Always use your most recent med list. amLODIPine 5 mg tablet Commonly known as:  Idania Peng Take 1 Tab by mouth daily. calcium carbonate 200 mg calcium (500 mg) Chew Commonly known as:  TUMS Take 1 Tab by mouth as needed. cyclobenzaprine 5 mg tablet Commonly known as:  FLEXERIL Take 1 Tab by mouth three (3) times daily as needed for Muscle Spasm(s). LORazepam 0.5 mg tablet Commonly known as:  ATIVAN Take 1 Tab by mouth every four (4) hours as needed (chest pain). Max Daily Amount: 3 mg.  
  
 metoprolol succinate 25 mg XL tablet Commonly known as:  TOPROL-XL Take two 25 mg tablets daily  
  
 simethicone 80 mg chewable tablet Commonly known as:  Sloan Redder Take 80 mg by mouth every six (6) hours as needed for Flatulence. TYLENOL EXTRA STRENGTH 500 mg tablet Generic drug:  acetaminophen Take  by mouth every six (6) hours as needed for Pain. ZANTAC 150 mg tablet Generic drug:  raNITIdine Take 150 mg by mouth two (2) times a day. Prescriptions Sent to Pharmacy Refills  
 cyclobenzaprine (FLEXERIL) 5 mg tablet 5 Sig: Take 1 Tab by mouth three (3) times daily as needed for Muscle Spasm(s). Class: Normal  
 Pharmacy: [facility] [address], [place] Ph #: [phone] Route: Oral  
  
Follow-up Instructions Return in about 6 months (around 9/29/2018) for CPE x 30 min--. Introducing [facility] & HEALTH SERVICES! Dear Renny Sims: Thank you for requesting a CQuotient account. Our records indicate that you already have an active CQuotient account. You can access your account anytime at [email or web address] Did you know that you can access your hospital and ER discharge instructions at any time in CQuotient? You can also review all of your test results from your hospital stay or ER visit. Additional Information If you have questions, please visit the Frequently Asked Questions section of the CQuotient website at [email or web address] Remember, [employer] Computing[employer] is NOT to be used for urgent needs. For medical emergencies, dial 911. Now available from your iPhone and Android! Please provide this summary of care documentation to your next provider. Your primary care clinician is listed as Robinson TEJEDA. If you have any questions after today's visit, please call [phone].

## 2018-10-04 PROBLEM — E66.9 OBESITY (BMI 30.0-34.9): Status: ACTIVE | Noted: 2018-10-04

## 2018-10-04 PROBLEM — E78.00 PURE HYPERCHOLESTEROLEMIA: Status: ACTIVE | Noted: 2018-10-04

## 2018-10-04 NOTE — PROGRESS NOTES
HISTORY OF PRESENT ILLNESS Wendi Menon is a 52 y.o. male. HPI  
F/u HTN 
C/o right arm numbness intermitently x 4 mos. 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 Asking about a testosterone level in the future 
  
 
 
Patient Active Problem List  
 Diagnosis Date Noted  Diastasis recti 03/31/2017  Essential hypertension 10/18/2016  Chest pain in adult 10/18/2016  Thyroid nodule 10/04/2016  Heart palpitations 03/24/2016 Current Outpatient Prescriptions Medication Sig Dispense Refill  cyclobenzaprine (FLEXERIL) 5 mg tablet Take 1 Tab by mouth three (3) times daily as needed for Muscle Spasm(s). 60 Tab 5  
 raNITIdine (ZANTAC) 150 mg tablet Take 150 mg by mouth two (2) times a day.  LORazepam (ATIVAN) 0.5 mg tablet Take 1 Tab by mouth every four (4) hours as needed (chest pain). Max Daily Amount: 3 mg. 60 Tab 1  
 amLODIPine (NORVASC) 5 mg tablet Take 1 Tab by mouth daily. 90 Tab 3  
 metoprolol succinate (TOPROL-XL) 25 mg XL tablet Take two 25 mg tablets daily 180 Tab 3  
 acetaminophen (TYLENOL EXTRA STRENGTH) 500 mg tablet Take  by mouth every six (6) hours as needed for Pain.  calcium carbonate (TUMS) 200 mg calcium (500 mg) chew Take 1 Tab by mouth as needed.  simethicone (MYLICON) 80 mg chewable tablet Take 80 mg by mouth every six (6) hours as needed for Flatulence. Allergies Allergen Reactions  Iodinated Contrast- Oral And Iv Dye Other (comments) \"resp reaction\"  Pcn [Penicillins] Unknown (comments)  Stings [Sting, Bee] Swelling Lab Results Component Value Date/Time Glucose 96 09/28/2017 04:17 PM  
 LDL, calculated 135 (H) 09/28/2017 04:17 PM  
Creatinine 0.97 09/28/2017 04:17 PM  
  
Lab Results Component Value Date/Time Cholesterol, total 207 (H) 09/28/2017 04:17 PM  
HDL Cholesterol 40 09/28/2017 04:17 PM  
LDL, calculated 135 (H) 09/28/2017 04:17 PM  
Triglyceride 161 (H) 09/28/2017 04:17 PM  
 
Lab Results Component Value Date/Time GFR est non-AA 88 09/28/2017 04:17 PM  
GFR est  09/28/2017 04:17 PM  
Creatinine 0.97 09/28/2017 04:17 PM  
BUN 17 09/28/2017 04:17 PM  
Sodium 144 09/28/2017 04:17 PM  
Potassium 4.6 09/28/2017 04:17 PM  
Chloride 105 09/28/2017 04:17 PM  
CO2 21 09/28/2017 04:17 PM  
Magnesium 2.0 09/23/2016 04:40 PM  
 
Lab Results Component Value Date/Time  
Prostate Specific Ag 0.9 09/28/2017 04:17 PM  
  
 
ROS Physical Exam  
Constitutional: He appears well-developed and well-nourished. No distress. Appears stated age, obese, nad HENT:  
Head: Normocephalic. Neck:  
Positive spurlings Cardiovascular: Normal rate, regular rhythm and normal heart sounds. Exam reveals no gallop and no friction rub. No murmur heard. Pulmonary/Chest: Effort normal and breath sounds normal. No respiratory distress. He has no wheezes. He has no rales. He exhibits no tenderness. Abdominal: Soft. Musculoskeletal: He exhibits no edema. Neurological: He is alert. Neg tinels on right Intact  and b/l arm strength Psychiatric: He has a normal mood and affect. Nursing note and vitals reviewed. ASSESSMENT and PLAN Diagnoses and all orders for this visit: 1. Essential hypertension 
-     CBC W/O DIFF 
-     METABOLIC PANEL, COMPREHENSIVE 
-     LIPID PANEL 
 controlled 2. Fatigue, unspecified type 
-     TSH 3RD GENERATION 
-     TESTOSTERONE, TOTAL, ADULT MALE 3. Prostate cancer screening -     PSA SCREENING (SCREENING) () 4. Pure hypercholesterolemia -     LIPID PANEL 
-     TSH 3RD GENERATION 5. Obesity (BMI 30.0-34. 9) I have reviewed/discussed the above normal BMI with the patient. I have recommended the following interventions: dietary management education, guidance, and counseling and encourage exercise . Analilia Shah 6. Anxiety Controlled Rare use of ativan 7. Encounter for hepatitis C screening test for low risk patient 
-     HEPATITIS C AB 
 
8. Cervical radicular pain Naprosyn rx Will refer to ortho spine if symptoms worse or not improved, pt will call for referral if needed 9. GERD Controlled on zantac , refilled Other orders 
-     amLODIPine (NORVASC) 5 mg tablet; Take 1 Tab by mouth daily. -     metoprolol succinate (TOPROL-XL) 25 mg XL tablet; Take two 25 mg tablets daily 
-     raNITIdine (ZANTAC) 150 mg tablet; Take 1 Tab by mouth two (2) times a day. -     naproxen (NAPROSYN) 500 mg tablet; Take 1 Tab by mouth two (2) times daily as needed. Follow-up Disposition: 
Return in about 6 months (around 4/5/2019) for htn hld cervical radicular pain.

## 2018-10-05 ENCOUNTER — OFFICE VISIT (OUTPATIENT)
Dept: INTERNAL MEDICINE CLINIC | Age: 53
End: 2018-10-05

## 2018-10-05 VITALS
HEIGHT: 74 IN | WEIGHT: 247 LBS | OXYGEN SATURATION: 97 % | HEART RATE: 83 BPM | TEMPERATURE: 98.1 F | BODY MASS INDEX: 31.7 KG/M2 | DIASTOLIC BLOOD PRESSURE: 83 MMHG | SYSTOLIC BLOOD PRESSURE: 145 MMHG

## 2018-10-05 DIAGNOSIS — R53.83 FATIGUE, UNSPECIFIED TYPE: ICD-10-CM

## 2018-10-05 DIAGNOSIS — Z11.59 ENCOUNTER FOR HEPATITIS C SCREENING TEST FOR LOW RISK PATIENT: ICD-10-CM

## 2018-10-05 DIAGNOSIS — E78.00 PURE HYPERCHOLESTEROLEMIA: ICD-10-CM

## 2018-10-05 DIAGNOSIS — I10 ESSENTIAL HYPERTENSION: Primary | ICD-10-CM

## 2018-10-05 DIAGNOSIS — Z12.5 PROSTATE CANCER SCREENING: ICD-10-CM

## 2018-10-05 DIAGNOSIS — K21.9 GASTROESOPHAGEAL REFLUX DISEASE, ESOPHAGITIS PRESENCE NOT SPECIFIED: ICD-10-CM

## 2018-10-05 DIAGNOSIS — M54.12 CERVICAL RADICULAR PAIN: ICD-10-CM

## 2018-10-05 DIAGNOSIS — F41.9 ANXIETY: ICD-10-CM

## 2018-10-05 DIAGNOSIS — E66.9 OBESITY (BMI 30.0-34.9): ICD-10-CM

## 2018-10-05 RX ORDER — RANITIDINE 150 MG/1
150 TABLET, FILM COATED ORAL 2 TIMES DAILY
Qty: 180 TAB | Refills: 3 | Status: SHIPPED | OUTPATIENT
Start: 2018-10-05 | End: 2020-10-30 | Stop reason: ALTCHOICE

## 2018-10-05 RX ORDER — METOPROLOL SUCCINATE 25 MG/1
TABLET, EXTENDED RELEASE ORAL
Qty: 180 TAB | Refills: 3 | Status: SHIPPED | OUTPATIENT
Start: 2018-10-05 | End: 2019-09-27 | Stop reason: SDUPTHER

## 2018-10-05 RX ORDER — NAPROXEN 500 MG/1
500 TABLET ORAL
Qty: 30 TAB | Refills: 0 | Status: SHIPPED | OUTPATIENT
Start: 2018-10-05 | End: 2018-11-13 | Stop reason: SDUPTHER

## 2018-10-05 RX ORDER — AMLODIPINE BESYLATE 5 MG/1
5 TABLET ORAL DAILY
Qty: 90 TAB | Refills: 3 | Status: SHIPPED | OUTPATIENT
Start: 2018-10-05 | End: 2019-09-27 | Stop reason: SDUPTHER

## 2018-10-05 NOTE — PROGRESS NOTES
Chief Complaint Patient presents with  Hypertension 6 month follow up  Cholesterol Problem 6 month follow up  Follow-up Fatique from 6 months ago  Labs Non fasting 6 month follow up  Medication Refill  
  all RX's  Medication Evaluation Zantac and insurance coverage  Numbness Right arm

## 2018-10-05 NOTE — MR AVS SNAPSHOT
[address] 
[phone] Patient: Wendi Menon MRN: [id number] :[id number] Visit Information Date & Time Provider Department Dept. Phone Encounter #  
 10/5/2018  7:45 AM Douglas Ketans,  [facility] [address] [phone] [id number] Follow-up Instructions Return in about 6 months (around 2019) for htn hld cervical radicular pain. Upcoming Health Maintenance Date Due Hepatitis C Screening 1965 DTaP/Tdap/Td series (1 - Tdap) 1984 Shingrix Vaccine Age 50> (1 of 2) 2013 Influenza Age 5 to Adult 3/31/2019* COLONOSCOPY 2026 *Topic was postponed. The date shown is not the original due date. Allergies as of 10/5/2018  Review Complete On: 10/5/2018 By: Cheyenne Sarmiento LPN Severity Noted Reaction Type Reactions Iodinated Contrast- Oral And Iv Dye High 2016    Other (comments) \"resp reaction\" Pcn [Penicillins]  2015    Unknown (comments) Stings [Sting, Bee]  2016    Swelling Current Immunizations  Never Reviewed No immunizations on file. Not reviewed this visit You Were Diagnosed With   
  
 Codes Comments Essential hypertension    -  Primary ICD-10-CM: I10 
ICD-9-CM: 401.9 Fatigue, unspecified type     ICD-10-CM: R53.83 ICD-9-CM: 780.79 Prostate cancer screening     ICD-10-CM: Z12.5 ICD-9-CM: V76.44 Pure hypercholesterolemia     ICD-10-CM: E78.00 ICD-9-CM: 272.0 Obesity (BMI 30.0-34.9)     ICD-10-CM: V83.1 ICD-9-CM: 278.00 Anxiety     ICD-10-CM: F41.9 ICD-9-CM: 300.00 Encounter for hepatitis C screening test for low risk patient     ICD-10-CM: Z11.59 
ICD-9-CM: V73.89 Cervical radicular pain     ICD-10-CM: M54.12 
ICD-9-CM: 723.4 Vitals BP Pulse Temp Height(growth percentile) Weight(growth percentile) SpO2 145/83 (BP 1 Location: Left arm, BP Patient Position: Sitting) 83 98.1 °F (36.7 °C) (Oral) 6' 2\" (1.88 m) 247 lb (112 kg) 97% BMI Smoking Status 31.71 kg/m2 Former Smoker BMI and BSA Data Body Mass Index Body Surface Area 31.71 kg/m 2 2.42 m 2 Preferred Pharmacy Pharmacy Name Phone [address] [place] [address] [phone] Your Updated Medication List  
  
   
This list is accurate as of 10/5/18  8:10 AM.  Always use your most recent med list. amLODIPine 5 mg tablet Commonly known as:  Aida Greening Take 1 Tab by mouth daily. calcium carbonate 200 mg calcium (500 mg) Chew Commonly known as:  TUMS Take 1 Tab by mouth as needed. cyclobenzaprine 5 mg tablet Commonly known as:  FLEXERIL Take 1 Tab by mouth three (3) times daily as needed for Muscle Spasm(s). LORazepam 0.5 mg tablet Commonly known as:  ATIVAN Take 1 Tab by mouth every four (4) hours as needed (chest pain). Max Daily Amount: 3 mg.  
  
 metoprolol succinate 25 mg XL tablet Commonly known as:  TOPROL-XL Take two 25 mg tablets daily  
  
 naproxen 500 mg tablet Commonly known as:  NAPROSYN Take 1 Tab by mouth two (2) times daily as needed. raNITIdine 150 mg tablet Commonly known as:  ZANTAC Take 1 Tab by mouth two (2) times a day. simethicone 80 mg chewable tablet Commonly known as:  Viola Cost Take 80 mg by mouth every six (6) hours as needed for Flatulence. TYLENOL EXTRA STRENGTH 500 mg tablet Generic drug:  acetaminophen Take  by mouth every six (6) hours as needed for Pain. Prescriptions Sent to Pharmacy Refills  
 amLODIPine (NORVASC) 5 mg tablet 3 Sig: Take 1 Tab by mouth daily. Class: Normal  
 Pharmacy: [facility] [address], [place] Ph #: [phone]  Route: Oral  
 metoprolol succinate (TOPROL-XL) 25 mg XL tablet 3 Sig: Take two 25 mg tablets daily Class: Normal  
 Pharmacy: [facility] [address], [place] Ph #: [phone]  
 raNITIdine (ZANTAC) 150 mg tablet 3 Sig: Take 1 Tab by mouth two (2) times a day. Class: Normal  
 Pharmacy: [facility] [address], [place] Ph #: [phone] Route: Oral  
 naproxen (NAPROSYN) 500 mg tablet 0 Sig: Take 1 Tab by mouth two (2) times daily as needed. Class: Normal  
 Pharmacy: [facility] [address], [place] Ph #: [phone] Route: Oral  
  
We Performed the Following CBC W/O DIFF [34096 CPT(R)] HEPATITIS C AB [63701 CPT(R)] LIPID PANEL [21896 CPT(R)] METABOLIC PANEL, COMPREHENSIVE [18763 CPT(R)] PSA SCREENING (SCREENING) [ [facility]] TESTOSTERONE, TOTAL, ADULT MALE [21305 CPT(R)] TSH 3RD GENERATION [36334 CPT(R)] Follow-up Instructions Return in about 6 months (around 4/5/2019) for htn hld cervical radicular pain. Introducing [facility] & HEALTH SERVICES! Dear Juve Omalley: Thank you for requesting a iCabbi account. Our records indicate that you already have an active iCabbi account. You can access your account anytime at [email or web address] Did you know that you can access your hospital and ER discharge instructions at any time in iCabbi? You can also review all of your test results from your hospital stay or ER visit. Additional Information If you have questions, please visit the Frequently Asked Questions section of the iCabbi website at [email or web address] Remember, iCabbi is NOT to be used for urgent needs. For medical emergencies, dial 911. Now available from your iPhone and Android! Please provide this summary of care documentation to your next provider. Your primary care clinician is listed as An TEJEDA. If you have any questions after today's visit, please call [phone].

## 2018-10-06 LAB
ALBUMIN SERPL-MCNC: 4.2 G/DL (ref 3.5–5.5)
ALBUMIN/GLOB SERPL: 1.4 {RATIO} (ref 1.2–2.2)
ALP SERPL-CCNC: 91 IU/L (ref 39–117)
ALT SERPL-CCNC: 46 IU/L (ref 0–44)
AST SERPL-CCNC: 29 IU/L (ref 0–40)
BILIRUB SERPL-MCNC: 0.3 MG/DL (ref 0–1.2)
BUN SERPL-MCNC: 15 MG/DL (ref 6–24)
BUN/CREAT SERPL: 16 (ref 9–20)
CALCIUM SERPL-MCNC: 9.4 MG/DL (ref 8.7–10.2)
CHLORIDE SERPL-SCNC: 107 MMOL/L (ref 96–106)
CHOLEST SERPL-MCNC: 208 MG/DL (ref 100–199)
CO2 SERPL-SCNC: 20 MMOL/L (ref 20–29)
CREAT SERPL-MCNC: 0.95 MG/DL (ref 0.76–1.27)
ERYTHROCYTE [DISTWIDTH] IN BLOOD BY AUTOMATED COUNT: 13.5 % (ref 12.3–15.4)
GLOBULIN SER CALC-MCNC: 2.9 G/DL (ref 1.5–4.5)
GLUCOSE SERPL-MCNC: 120 MG/DL (ref 65–99)
HCT VFR BLD AUTO: 45.1 % (ref 37.5–51)
HCV AB S/CO SERPL IA: <0.1 S/CO RATIO (ref 0–0.9)
HDLC SERPL-MCNC: 42 MG/DL
HGB BLD-MCNC: 16.3 G/DL (ref 13–17.7)
LDLC SERPL CALC-MCNC: 150 MG/DL (ref 0–99)
MCH RBC QN AUTO: 32.7 PG (ref 26.6–33)
MCHC RBC AUTO-ENTMCNC: 36.1 G/DL (ref 31.5–35.7)
MCV RBC AUTO: 90 FL (ref 79–97)
PLATELET # BLD AUTO: 314 X10E3/UL (ref 150–379)
POTASSIUM SERPL-SCNC: 4.3 MMOL/L (ref 3.5–5.2)
PROT SERPL-MCNC: 7.1 G/DL (ref 6–8.5)
PSA SERPL-MCNC: 1 NG/ML (ref 0–4)
RBC # BLD AUTO: 4.99 X10E6/UL (ref 4.14–5.8)
SODIUM SERPL-SCNC: 143 MMOL/L (ref 134–144)
TESTOST SERPL-MCNC: 737 NG/DL (ref 264–916)
TRIGL SERPL-MCNC: 80 MG/DL (ref 0–149)
TSH SERPL DL<=0.005 MIU/L-ACNC: 1.87 UIU/ML (ref 0.45–4.5)
VLDLC SERPL CALC-MCNC: 16 MG/DL (ref 5–40)
WBC # BLD AUTO: 9.5 X10E3/UL (ref 3.4–10.8)

## 2019-03-28 NOTE — PROGRESS NOTES
HISTORY OF PRESENT ILLNESS Bart Collins is a 62 y.o. male. HPI  
 
 F/u HTN HLD GERD cervical radicular pain Anxiety , Hyperglycemia Only occasionally has anxiety symptoms needing ativan prn Has rare palpitations Salads for lunch lost a few lbs Last OV 
C/o right arm numbness intermitently x 4 mos. 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 Asking about a testosterone level in the future 
  
 
 
 
Patient Active Problem List  
 Diagnosis Date Noted  Pure hypercholesterolemia 10/04/2018  Obesity (BMI 30.0-34.9) 10/04/2018  Diastasis recti 03/31/2017  Essential hypertension 10/18/2016  Chest pain in adult 10/18/2016  Thyroid nodule 10/04/2016  Heart palpitations 03/24/2016 Current Outpatient Medications Medication Sig Dispense Refill  naproxen (NAPROSYN) 500 mg tablet TAKE 1 TABLET BY MOUTH TWICE DAILY AS NEEDED 30 Tab 3  
 amLODIPine (NORVASC) 5 mg tablet Take 1 Tab by mouth daily. 90 Tab 3  
 metoprolol succinate (TOPROL-XL) 25 mg XL tablet Take two 25 mg tablets daily 180 Tab 3  
 raNITIdine (ZANTAC) 150 mg tablet Take 1 Tab by mouth two (2) times a day. 180 Tab 3  
 LORazepam (ATIVAN) 0.5 mg tablet Take 1 Tab by mouth every four (4) hours as needed (chest pain). Max Daily Amount: 3 mg. 60 Tab 1  
 acetaminophen (TYLENOL EXTRA STRENGTH) 500 mg tablet Take  by mouth every six (6) hours as needed for Pain.  calcium carbonate (TUMS) 200 mg calcium (500 mg) chew Take 1 Tab by mouth as needed. Allergies Allergen Reactions  Iodinated Contrast- Oral And Iv Dye Other (comments) \"resp reaction\"  Pcn [Penicillins] Unknown (comments)  Stings [Sting, Bee] Swelling Lab Results Component Value Date/Time WBC 9.5 10/05/2018 08:17 AM  
 HGB 16.3 10/05/2018 08:17 AM  
 HCT 45.1 10/05/2018 08:17 AM  
 PLATELET 500 70/97/3754 08:17 AM  
 MCV 90 10/05/2018 08:17 AM  
 
Lab Results Component Value Date/Time Glucose 120 (H) 10/05/2018 08:17 AM  
 LDL, calculated 150 (H) 10/05/2018 08:17 AM  
 Creatinine 0.95 10/05/2018 08:17 AM  
  
Lab Results Component Value Date/Time Cholesterol, total 208 (H) 10/05/2018 08:17 AM  
 HDL Cholesterol 42 10/05/2018 08:17 AM  
 LDL, calculated 150 (H) 10/05/2018 08:17 AM  
 Triglyceride 80 10/05/2018 08:17 AM  
 
Lab Results Component Value Date/Time GFR est non-AA 90 10/05/2018 08:17 AM  
 GFR est  10/05/2018 08:17 AM  
 Creatinine 0.95 10/05/2018 08:17 AM  
 BUN 15 10/05/2018 08:17 AM  
 Sodium 143 10/05/2018 08:17 AM  
 Potassium 4.3 10/05/2018 08:17 AM  
 Chloride 107 (H) 10/05/2018 08:17 AM  
 CO2 20 10/05/2018 08:17 AM  
 Magnesium 2.0 09/23/2016 04:40 PM  
  
ROS Physical Exam  
Constitutional: He appears well-developed and well-nourished. No distress. Appears stated age, obese, nad HENT:  
Head: Normocephalic. Cardiovascular: Normal rate, regular rhythm and normal heart sounds. Exam reveals no gallop and no friction rub. No murmur heard. Pulmonary/Chest: Effort normal and breath sounds normal. No respiratory distress. He has no wheezes. He has no rales. He exhibits no tenderness. Abdominal: Soft. Musculoskeletal: He exhibits no edema. Neurological: He is alert. Psychiatric: He has a normal mood and affect. Nursing note and vitals reviewed. ASSESSMENT and PLAN Diagnoses and all orders for this visit: 1. Essential hypertension Reasonable control-continue medicines 2. Pure hypercholesterolemia -     LIPID PANEL Consider statin -discussed 3. Elevated glucose 
-     HEMOGLOBIN A1C WITH EAG 
-     GLUCOSE, RANDOM Continue low carb diet and weight reduction 4. Gastroesophageal reflux disease, esophagitis presence not specified Controlled on zantac 5. Anxiety Prn ativan helpful Follow-up and Dispositions · Return in about 6 months (around 9/29/2019) for htn hld prediabetes.

## 2019-03-29 ENCOUNTER — OFFICE VISIT (OUTPATIENT)
Dept: INTERNAL MEDICINE CLINIC | Age: 54
End: 2019-03-29

## 2019-03-29 VITALS
TEMPERATURE: 97.7 F | RESPIRATION RATE: 14 BRPM | WEIGHT: 245 LBS | HEART RATE: 74 BPM | SYSTOLIC BLOOD PRESSURE: 142 MMHG | OXYGEN SATURATION: 96 % | HEIGHT: 74 IN | BODY MASS INDEX: 31.44 KG/M2 | DIASTOLIC BLOOD PRESSURE: 79 MMHG

## 2019-03-29 DIAGNOSIS — F41.9 ANXIETY: ICD-10-CM

## 2019-03-29 DIAGNOSIS — E78.00 PURE HYPERCHOLESTEROLEMIA: ICD-10-CM

## 2019-03-29 DIAGNOSIS — I10 ESSENTIAL HYPERTENSION: Primary | ICD-10-CM

## 2019-03-29 DIAGNOSIS — R73.09 ELEVATED GLUCOSE: ICD-10-CM

## 2019-03-29 DIAGNOSIS — K21.9 GASTROESOPHAGEAL REFLUX DISEASE, ESOPHAGITIS PRESENCE NOT SPECIFIED: ICD-10-CM

## 2019-03-29 NOTE — PROGRESS NOTES
Chief Complaint Patient presents with  Hypertension 6 month follow up  Medication Refill  
  all medications  Labs Fasting ,6 month follow up  Palpitations  
  off and on but better than in the past

## 2019-03-30 LAB
CHOLEST SERPL-MCNC: 207 MG/DL (ref 100–199)
EST. AVERAGE GLUCOSE BLD GHB EST-MCNC: 128 MG/DL
GLUCOSE SERPL-MCNC: 105 MG/DL (ref 65–99)
HBA1C MFR BLD: 6.1 % (ref 4.8–5.6)
HDLC SERPL-MCNC: 42 MG/DL
LDLC SERPL CALC-MCNC: 143 MG/DL (ref 0–99)
TRIGL SERPL-MCNC: 111 MG/DL (ref 0–149)
VLDLC SERPL CALC-MCNC: 22 MG/DL (ref 5–40)

## 2019-04-08 DIAGNOSIS — E78.00 PURE HYPERCHOLESTEROLEMIA: Primary | ICD-10-CM

## 2019-04-08 RX ORDER — ATORVASTATIN CALCIUM 10 MG/1
10 TABLET, FILM COATED ORAL DAILY
Qty: 30 TAB | Refills: 1 | Status: SHIPPED | OUTPATIENT
Start: 2019-04-08 | End: 2019-09-27 | Stop reason: SDUPTHER

## 2019-09-27 ENCOUNTER — OFFICE VISIT (OUTPATIENT)
Dept: INTERNAL MEDICINE CLINIC | Age: 54
End: 2019-09-27

## 2019-09-27 VITALS
RESPIRATION RATE: 16 BRPM | HEIGHT: 74 IN | SYSTOLIC BLOOD PRESSURE: 139 MMHG | WEIGHT: 240 LBS | TEMPERATURE: 98 F | BODY MASS INDEX: 30.8 KG/M2 | DIASTOLIC BLOOD PRESSURE: 81 MMHG | HEART RATE: 78 BPM | OXYGEN SATURATION: 94 %

## 2019-09-27 DIAGNOSIS — Z23 ENCOUNTER FOR IMMUNIZATION: ICD-10-CM

## 2019-09-27 DIAGNOSIS — R73.03 PREDIABETES: ICD-10-CM

## 2019-09-27 DIAGNOSIS — E78.00 PURE HYPERCHOLESTEROLEMIA: ICD-10-CM

## 2019-09-27 DIAGNOSIS — I10 ESSENTIAL HYPERTENSION: Primary | ICD-10-CM

## 2019-09-27 DIAGNOSIS — Z12.5 PROSTATE CANCER SCREENING: ICD-10-CM

## 2019-09-27 RX ORDER — AMLODIPINE BESYLATE 5 MG/1
5 TABLET ORAL DAILY
Qty: 90 TAB | Refills: 3 | Status: SHIPPED | OUTPATIENT
Start: 2019-09-27 | End: 2020-10-30 | Stop reason: SDUPTHER

## 2019-09-27 RX ORDER — METOPROLOL SUCCINATE 25 MG/1
TABLET, EXTENDED RELEASE ORAL
Qty: 180 TAB | Refills: 3 | Status: SHIPPED | OUTPATIENT
Start: 2019-09-27 | End: 2020-10-30 | Stop reason: SDUPTHER

## 2019-09-27 RX ORDER — ATORVASTATIN CALCIUM 10 MG/1
10 TABLET, FILM COATED ORAL DAILY
Qty: 90 TAB | Refills: 3 | Status: SHIPPED | OUTPATIENT
Start: 2019-09-27 | End: 2020-07-15

## 2019-09-27 NOTE — PATIENT INSTRUCTIONS
Office Policies    Phone calls/patient messages:            Please allow up to 24 hours for someone in the office to contact you about your call or message. Be mindful your provider may be out of the office or your message may require further review. We encourage you to use [facility] for your messages as this is a faster, more efficient way to communicate with our office                         Medication Refills:            Prescription medications require 48-72 business hours to process. We encourage you to use [facility] for your refills. For controlled medications: Please allow 72 business hours to process. Certain medications may require you to  a written prescription at our office. NO narcotic/controlled medications will be prescribed after 4pm Monday through Friday or on weekends              Form/Paperwork Completion:            Please note a $25 fee may incur for all paperwork for completed by our providers. We ask that you allow 7-10 business days. Pre-payment is due prior to picking up/faxing the completed form. You may also download your forms to [facility] to have your doctor print off.

## 2019-09-27 NOTE — PROGRESS NOTES
Chief Complaint   Patient presents with    Hypertension     6 month follow up    Cholesterol Problem     6 month follow up    Medication Evaluation     Zantac    Labs     Fasting

## 2019-09-28 LAB
ALBUMIN SERPL-MCNC: 4.2 G/DL (ref 3.5–5.5)
ALBUMIN/GLOB SERPL: 1.6 {RATIO} (ref 1.2–2.2)
ALP SERPL-CCNC: 87 IU/L (ref 39–117)
ALT SERPL-CCNC: 43 IU/L (ref 0–44)
AST SERPL-CCNC: 27 IU/L (ref 0–40)
BILIRUB SERPL-MCNC: 0.4 MG/DL (ref 0–1.2)
BUN SERPL-MCNC: 19 MG/DL (ref 6–24)
BUN/CREAT SERPL: 16 (ref 9–20)
CALCIUM SERPL-MCNC: 9.5 MG/DL (ref 8.7–10.2)
CHLORIDE SERPL-SCNC: 105 MMOL/L (ref 96–106)
CHOLEST SERPL-MCNC: 147 MG/DL (ref 100–199)
CO2 SERPL-SCNC: 21 MMOL/L (ref 20–29)
CREAT SERPL-MCNC: 1.16 MG/DL (ref 0.76–1.27)
ERYTHROCYTE [DISTWIDTH] IN BLOOD BY AUTOMATED COUNT: 12.5 % (ref 12.3–15.4)
EST. AVERAGE GLUCOSE BLD GHB EST-MCNC: 120 MG/DL
GLOBULIN SER CALC-MCNC: 2.7 G/DL (ref 1.5–4.5)
GLUCOSE SERPL-MCNC: 104 MG/DL (ref 65–99)
HBA1C MFR BLD: 5.8 % (ref 4.8–5.6)
HCT VFR BLD AUTO: 44.5 % (ref 37.5–51)
HDLC SERPL-MCNC: 41 MG/DL
HGB BLD-MCNC: 15.6 G/DL (ref 13–17.7)
LDLC SERPL CALC-MCNC: 90 MG/DL (ref 0–99)
MCH RBC QN AUTO: 31.9 PG (ref 26.6–33)
MCHC RBC AUTO-ENTMCNC: 35.1 G/DL (ref 31.5–35.7)
MCV RBC AUTO: 91 FL (ref 79–97)
PLATELET # BLD AUTO: 300 X10E3/UL (ref 150–450)
POTASSIUM SERPL-SCNC: 4.3 MMOL/L (ref 3.5–5.2)
PROT SERPL-MCNC: 6.9 G/DL (ref 6–8.5)
PSA SERPL-MCNC: 0.9 NG/ML (ref 0–4)
RBC # BLD AUTO: 4.89 X10E6/UL (ref 4.14–5.8)
REFLEX CRITERIA: NORMAL
SODIUM SERPL-SCNC: 142 MMOL/L (ref 134–144)
TRIGL SERPL-MCNC: 78 MG/DL (ref 0–149)
VLDLC SERPL CALC-MCNC: 16 MG/DL (ref 5–40)
WBC # BLD AUTO: 9.8 X10E3/UL (ref 3.4–10.8)

## 2020-01-02 ENCOUNTER — TELEPHONE (OUTPATIENT)
Dept: INTERNAL MEDICINE CLINIC | Age: 55
End: 2020-01-02

## 2020-01-02 NOTE — TELEPHONE ENCOUNTER
Patient states he's trying yo return your call again & is requesting to call back on his Cell number [phone]

## 2020-01-03 RX ORDER — METHYLPREDNISOLONE 4 MG/1
TABLET ORAL
Qty: 1 DOSE PACK | Refills: 0 | Status: SHIPPED | OUTPATIENT
Start: 2020-01-03 | End: 2020-10-30 | Stop reason: ALTCHOICE

## 2020-01-03 NOTE — TELEPHONE ENCOUNTER
Jamison Boggs MD  You 17 minutes ago (3:33 PM)     Please tell pt I escribed a medrol christine -hold naprosyn while on medrol    Routing comment      Notified pt via voicemail.

## 2020-01-03 NOTE — TELEPHONE ENCOUNTER
Called, spoke to pt. Two identifiers confirmed. Pt c/o a pinched nerve in his back. Pt currently taking naproxen for pain twice a day. Pt stated the naproxen is no longer helping with his pain. Pt would like to know if he can take more than 2 tablets a day of naproxen or if he can get a steroid pack. Notified pt I would send message to Dr. Yevgeniy Molina. Pt verbalized understanding of information discussed w/ no further questions at this time.

## 2020-03-17 ENCOUNTER — TELEPHONE (OUTPATIENT)
Dept: INTERNAL MEDICINE CLINIC | Age: 55
End: 2020-03-17

## 2020-03-17 NOTE — TELEPHONE ENCOUNTER
Caller's first and last name: n/a   Reason for call: Patient has requested to cancel his upcoming appointment for 3/27, but will still need all medications refilled. Name of medications not provided. Patient has expressed that he would like to avoid coming into the office during this virus outbreak.    Callback required yes/no and why: yes   Best contact number(s): [phone]   Details to clarify the request: n/a

## 2020-03-17 NOTE — TELEPHONE ENCOUNTER
Called, left vm for pt to return call to office. Notified pt via voicemail refills should last until September.

## 2020-07-15 DIAGNOSIS — E78.00 PURE HYPERCHOLESTEROLEMIA: ICD-10-CM

## 2020-07-15 RX ORDER — ATORVASTATIN CALCIUM 10 MG/1
TABLET, FILM COATED ORAL
Qty: 90 TAB | Refills: 0 | Status: SHIPPED | OUTPATIENT
Start: 2020-07-15 | End: 2020-10-30 | Stop reason: SDUPTHER

## 2020-09-18 ENCOUNTER — TELEPHONE (OUTPATIENT)
Dept: INTERNAL MEDICINE CLINIC | Age: 55
End: 2020-09-18

## 2020-09-18 NOTE — TELEPHONE ENCOUNTER
#[phone]   Pt states his 9-23-20 visit did not work for him due to work. Pt is off every Friday. He would like a Friday VV. Pt is asking if you can just text him an appt time and what Friday that will be. He will be fine with this. He can't hear calls at work and prefers no vm, just text if you can. If you need to leave a vm please be sure it is detailed. Thanks.

## 2020-09-18 NOTE — TELEPHONE ENCOUNTER
VV scheduled for 10/30 @ 1030 with Dr. Henrik Connolly. Pt verbalized understanding of information discussed w/ no further questions at this time.

## 2020-10-30 ENCOUNTER — VIRTUAL VISIT (OUTPATIENT)
Dept: INTERNAL MEDICINE CLINIC | Age: 55
End: 2020-10-30
Payer: COMMERCIAL

## 2020-10-30 DIAGNOSIS — E78.00 PURE HYPERCHOLESTEROLEMIA: ICD-10-CM

## 2020-10-30 DIAGNOSIS — R73.03 PREDIABETES: ICD-10-CM

## 2020-10-30 DIAGNOSIS — E78.5 HYPERLIPIDEMIA, UNSPECIFIED HYPERLIPIDEMIA TYPE: ICD-10-CM

## 2020-10-30 DIAGNOSIS — I10 ESSENTIAL HYPERTENSION: ICD-10-CM

## 2020-10-30 DIAGNOSIS — Z00.00 ROUTINE GENERAL MEDICAL EXAMINATION AT A HEALTH CARE FACILITY: Primary | ICD-10-CM

## 2020-10-30 PROCEDURE — 99443 PR PHYS/QHP TELEPHONE EVALUATION 21-30 MIN: CPT | Performed by: INTERNAL MEDICINE

## 2020-10-30 RX ORDER — AMLODIPINE BESYLATE 5 MG/1
5 TABLET ORAL DAILY
Qty: 90 TAB | Refills: 3 | Status: SHIPPED | OUTPATIENT
Start: 2020-10-30 | End: 2021-10-29 | Stop reason: SDUPTHER

## 2020-10-30 RX ORDER — ATORVASTATIN CALCIUM 10 MG/1
10 TABLET, FILM COATED ORAL DAILY
Qty: 90 TAB | Refills: 3 | Status: SHIPPED | OUTPATIENT
Start: 2020-10-30 | End: 2022-03-24

## 2020-10-30 RX ORDER — METOPROLOL SUCCINATE 25 MG/1
TABLET, EXTENDED RELEASE ORAL
Qty: 180 TAB | Refills: 3 | Status: SHIPPED | OUTPATIENT
Start: 2020-10-30 | End: 2021-10-29 | Stop reason: SDUPTHER

## 2020-10-30 NOTE — TELEPHONE ENCOUNTER
Future Appointments:  Future Appointments   Date Time Provider [place] Valverde   10/30/2020 10:30 AM Yon Case MD [facility] BS AMB        Last Appointment With Me:  Visit date not found     Requested Prescriptions     Pending Prescriptions Disp Refills    amLODIPine (NORVASC) 5 mg tablet 90 Tab 3     Sig: Take 1 Tab by mouth daily.  atorvastatin (LIPITOR) 10 mg tablet 90 Tab 3     Sig: Take 1 Tab by mouth.     metoprolol succinate (TOPROL-XL) 25 mg XL tablet 180 Tab 3     Sig: Take two 25 mg tablets daily

## 2020-10-30 NOTE — PATIENT INSTRUCTIONS
This is an established visit conducted via telemedicine. The patient has been instructed that this meets HIPAA criteria and acknowledges and agrees to this method of visitation. Randon Aase, LPN 
07/26/90 
26:30 AM 
1. Have you been to the ER, urgent care clinic since your last visit? Hospitalized since your last visit? No 
 
2. Have you seen or consulted any other health care providers outside of the [address] [place] [address] since your last visit? Include any pap smears or colon screening.  No

## 2020-10-30 NOTE — PROGRESS NOTES
HISTORY OF PRESENT ILLNESS  Vonda Joe is a 60 y.o. male. HPI     VV via telephone encounter d/t covid 19 pandemic x 21 minutes        Here for CPE and F/u HTN HLD GERD cervical radicular pain Anxiety , Hyperglycemia . palpaitations  Infrequent palpitations  bp at home was 128/84  rarely takes ativan for anxiety  Weight up a few lbs this year  Not on a strict diet  Infrequent GERd monthly at month  No neck pain  Joints more achy since starting statin  Last OV  Last a1c 6.1 LDl 143  Takes ativan prn for anxiety   lipitor was prescribed since last OV-tolerating  Weight down 5 lbs with diet  Has occassional palpitations but much less since starting on toprol          Patient Active Problem List    Diagnosis Date Noted    Pure hypercholesterolemia 10/04/2018    Obesity (BMI 30.0-34.9) 10/04/2018    Diastasis recti 03/31/2017    Essential hypertension 10/18/2016    Chest pain in adult 10/18/2016    Thyroid nodule 10/04/2016    Heart palpitations 03/24/2016     Current Outpatient Medications   Medication Sig Dispense Refill    atorvastatin (LIPITOR) 10 mg tablet Take 1 tablet by mouth once daily 90 Tab 0    amLODIPine (NORVASC) 5 mg tablet Take 1 Tab by mouth daily. 90 Tab 3    metoprolol succinate (TOPROL-XL) 25 mg XL tablet Take two 25 mg tablets daily 180 Tab 3    acetaminophen (TYLENOL EXTRA STRENGTH) 500 mg tablet Take  by mouth every six (6) hours as needed for Pain.  calcium carbonate (TUMS) 200 mg calcium (500 mg) chew Take 1 Tab by mouth as needed.  naproxen (NAPROSYN) 500 mg tablet TAKE 1 TABLET BY MOUTH TWICE DAILY AS NEEDED 30 Tab 3    LORazepam (ATIVAN) 0.5 mg tablet Take 1 Tab by mouth every four (4) hours as needed (chest pain).  Max Daily Amount: 3 mg. 60 Tab 1     Allergies   Allergen Reactions    Iodinated Contrast Media Other (comments)     \"resp reaction\"    Pcn [Penicillins] Unknown (comments)    Stings [Sting, Bee] Swelling      Lab Results   Component Value Date/Time WBC 9.8 09/27/2019 09:33 AM    HGB 15.6 09/27/2019 09:33 AM    HCT 44.5 09/27/2019 09:33 AM    PLATELET 010 67/10/3527 09:33 AM    MCV 91 09/27/2019 09:33 AM     Lab Results   Component Value Date/Time    Hemoglobin A1c 5.8 (H) 09/27/2019 09:33 AM    Hemoglobin A1c 6.1 (H) 03/29/2019 08:33 AM    Glucose 104 (H) 09/27/2019 09:33 AM    LDL, calculated 90 09/27/2019 09:33 AM    Creatinine 1.16 09/27/2019 09:33 AM      Lab Results   Component Value Date/Time    Cholesterol, total 147 09/27/2019 09:33 AM    HDL Cholesterol 41 09/27/2019 09:33 AM    LDL, calculated 90 09/27/2019 09:33 AM    Triglyceride 78 09/27/2019 09:33 AM     Lab Results   Component Value Date/Time    GFR est non-AA 70 09/27/2019 09:33 AM    GFR est AA 81 09/27/2019 09:33 AM    Creatinine 1.16 09/27/2019 09:33 AM    BUN 19 09/27/2019 09:33 AM    Sodium 142 09/27/2019 09:33 AM    Potassium 4.3 09/27/2019 09:33 AM    Chloride 105 09/27/2019 09:33 AM    CO2 21 09/27/2019 09:33 AM    Magnesium 2.0 09/23/2016 04:40 PM     Lab Results   Component Value Date/Time    Prostate Specific Ag 0.9 09/27/2019 09:33 AM    Prostate Specific Ag 1.0 10/05/2018 08:17 AM    Prostate Specific Ag 0.9 09/28/2017 04:17 PM     Lab Results   Component Value Date/Time    TSH 1.870 10/05/2018 08:17 AM    T4, Free 1.1 03/21/2016 10:21 PM      Lab Results   Component Value Date/Time    Glucose 104 (H) 09/27/2019 09:33 AM         Review of Systems   Constitutional: Negative for chills, fever, malaise/fatigue and weight loss. Eyes: Negative for blurred vision and double vision. Respiratory: Negative for cough and shortness of breath. Cardiovascular: Positive for palpitations. Negative for chest pain. Gastrointestinal: Positive for heartburn. Negative for abdominal pain, blood in stool, constipation, diarrhea, melena, nausea and vomiting. Genitourinary: Negative for dysuria, frequency, hematuria and urgency.    Musculoskeletal: Negative for back pain, falls, joint pain and myalgias. Neurological: Negative for dizziness, tremors and headaches. Physical Exam    ASSESSMENT and PLAN  Diagnoses and all orders for this visit:    1. Routine general medical examination at a health care facility   Fu sceening labs   Flu shot and shingrix recommended   Continue healthy lifestyle  2. Essential hypertension    Controlled per home readings  3. Hyperlipidemia, unspecified hyperlipidemia type   On statin   Some joint pain but may be work  related, OA-discussed  4.  Prediabetes   Aic   Weight reduction recommended      rtc 6 months

## 2021-03-17 NOTE — TELEPHONE ENCOUNTER
----- Message from Analia Rudd sent at 10/2/2017  4:44 PM EDT -----  Regarding: Dr. Rock Herrera  Pt was expecting a call back from the  from a conversation held almost a week ago. Best contact for the Pt is [phone] and (w) [phone]. Pt advised his next call will be to patient advocate.        Message copied/pasted from [facility] Hydroxyzine Counseling: Patient advised that the medication is sedating and not to drive a car after taking this medication.  Patient informed of potential adverse effects including but not limited to dry mouth, urinary retention, and blurry vision.  The patient verbalized understanding of the proper use and possible adverse effects of hydroxyzine.  All of the patient's questions and concerns were addressed.

## 2021-10-29 ENCOUNTER — OFFICE VISIT (OUTPATIENT)
Dept: INTERNAL MEDICINE CLINIC | Age: 56
End: 2021-10-29
Payer: COMMERCIAL

## 2021-10-29 VITALS
HEIGHT: 74 IN | OXYGEN SATURATION: 96 % | RESPIRATION RATE: 16 BRPM | HEART RATE: 94 BPM | DIASTOLIC BLOOD PRESSURE: 70 MMHG | WEIGHT: 254 LBS | TEMPERATURE: 97.3 F | SYSTOLIC BLOOD PRESSURE: 138 MMHG | BODY MASS INDEX: 32.6 KG/M2

## 2021-10-29 DIAGNOSIS — R73.03 PREDIABETES: ICD-10-CM

## 2021-10-29 DIAGNOSIS — Z00.00 ROUTINE GENERAL MEDICAL EXAMINATION AT A HEALTH CARE FACILITY: Primary | ICD-10-CM

## 2021-10-29 DIAGNOSIS — Z23 ENCOUNTER FOR IMMUNIZATION: ICD-10-CM

## 2021-10-29 DIAGNOSIS — E78.00 PURE HYPERCHOLESTEROLEMIA: ICD-10-CM

## 2021-10-29 DIAGNOSIS — I10 HYPERTENSION, UNSPECIFIED TYPE: ICD-10-CM

## 2021-10-29 PROCEDURE — 90750 HZV VACC RECOMBINANT IM: CPT | Performed by: INTERNAL MEDICINE

## 2021-10-29 PROCEDURE — 99396 PREV VISIT EST AGE 40-64: CPT | Performed by: INTERNAL MEDICINE

## 2021-10-29 PROCEDURE — 90471 IMMUNIZATION ADMIN: CPT | Performed by: INTERNAL MEDICINE

## 2021-10-29 RX ORDER — NAPROXEN 500 MG/1
500 TABLET ORAL 2 TIMES DAILY WITH MEALS
Qty: 30 TABLET | Refills: 1 | Status: SHIPPED | OUTPATIENT
Start: 2021-10-29

## 2021-10-29 RX ORDER — AMLODIPINE BESYLATE 5 MG/1
5 TABLET ORAL DAILY
Qty: 90 TABLET | Refills: 3 | Status: SHIPPED | OUTPATIENT
Start: 2021-10-29 | End: 2022-09-11

## 2021-10-29 RX ORDER — METOPROLOL SUCCINATE 25 MG/1
TABLET, EXTENDED RELEASE ORAL
Qty: 180 TABLET | Refills: 3 | Status: SHIPPED | OUTPATIENT
Start: 2021-10-29 | End: 2021-10-29 | Stop reason: SDUPTHER

## 2021-10-29 RX ORDER — METOPROLOL SUCCINATE 50 MG/1
TABLET, EXTENDED RELEASE ORAL
Qty: 90 TABLET | Refills: 3 | Status: SHIPPED | OUTPATIENT
Start: 2021-10-29

## 2021-10-29 NOTE — PROGRESS NOTES
HISTORY OF PRESENT ILLNESS  Ernst Caldera is a 60 y.o. male. HPI   Here for CPE and F/u HTN HLD GERD cervical radicular pain Anxiety , Hyperglycemia . Palpitations  Anxiety-controled  Palpitations--still has some palpitations maybe once per week-no change in past 2-3 years  Tobacco-quit tobacco 6 yrs ago then vaped 1 yr then quit  Weight up  abotu 14 lbs since last visit in office  Last OV        Infrequent palpitations  bp at home was 128/84  rarely takes ativan for anxiety  Weight up a few lbs this year  Not on a strict diet  Infrequent GERd monthly at month  No neck pain  Joints more achy since starting statin    Patient Active Problem List    Diagnosis Date Noted    Pure hypercholesterolemia 10/04/2018    Obesity (BMI 30.0-34.9) 10/04/2018    Diastasis recti 03/31/2017    Essential hypertension 10/18/2016    Chest pain in adult 10/18/2016    Thyroid nodule 10/04/2016    Heart palpitations 03/24/2016     Current Outpatient Medications   Medication Sig Dispense Refill    amLODIPine (NORVASC) 5 mg tablet Take 1 Tab by mouth daily. 90 Tab 3    atorvastatin (LIPITOR) 10 mg tablet Take 1 Tab by mouth daily. 90 Tab 3    metoprolol succinate (TOPROL-XL) 25 mg XL tablet Take two 25 mg tablets daily 180 Tab 3    naproxen (NAPROSYN) 500 mg tablet TAKE 1 TABLET BY MOUTH TWICE DAILY AS NEEDED 30 Tab 3    LORazepam (ATIVAN) 0.5 mg tablet Take 1 Tab by mouth every four (4) hours as needed (chest pain). Max Daily Amount: 3 mg. 60 Tab 1    acetaminophen (TYLENOL EXTRA STRENGTH) 500 mg tablet Take  by mouth every six (6) hours as needed for Pain.  calcium carbonate (TUMS) 200 mg calcium (500 mg) chew Take 1 Tab by mouth as needed. Allergies   Allergen Reactions    Iodinated Contrast Media Other (comments)     \"resp reaction\"    Pcn [Penicillins] Unknown (comments)    Stings [Sting, Bee] Swelling           Review of Systems   Constitutional: Negative for chills, fever, malaise/fatigue and weight loss. Eyes: Negative for blurred vision and double vision. Respiratory: Negative for cough and shortness of breath. Cardiovascular: Negative for chest pain and palpitations. Gastrointestinal: Negative for abdominal pain, blood in stool, constipation, diarrhea, melena, nausea and vomiting. Genitourinary: Negative for dysuria, frequency, hematuria and urgency. Musculoskeletal: Negative for back pain, falls, joint pain and myalgias. Neurological: Negative for dizziness, tremors and headaches. Physical Exam  Vitals and nursing note reviewed. Constitutional:       General: He is not in acute distress. Appearance: He is well-developed. He is obese. Comments: Appears stated age   HENT:      Head: Normocephalic. Right Ear: Tympanic membrane normal.      Left Ear: Tympanic membrane normal.   Cardiovascular:      Rate and Rhythm: Normal rate and regular rhythm. Heart sounds: Normal heart sounds. Pulmonary:      Effort: Pulmonary effort is normal.      Breath sounds: Normal breath sounds. Abdominal:      Palpations: Abdomen is soft. Musculoskeletal:         General: Normal range of motion. Cervical back: Normal range of motion. Skin:     General: Skin is warm. Neurological:      Mental Status: He is alert. Psychiatric:         Mood and Affect: Mood normal.         Behavior: Behavior normal.         Thought Content: Thought content normal.         Judgment: Judgment normal.         ASSESSMENT and PLAN  Diagnoses and all orders for this visit:    1. Routine general medical examination at a health care facility  -     CBC W/O DIFF; Future  -     HEMOGLOBIN A1C WITH EAG; Future  -     METABOLIC PANEL, COMPREHENSIVE; Future  -     LIPID PANEL; Future  -     TSH 3RD GENERATION; Future  -     PSA W/ REFLX FREE PSA; Future   shingrix today   Pt declines LDCT   Weight reduction recommended    2.  Hypertension, unspecified type   High normal bp and HR and a few palpitations   Increase toprol xl 50 mg qd  3. Pure hypercholesterolemia   On statin  4. Prediabetes   Advised weight reduction  5. Encounter for immunization  -     ZOSTER VACC RECOMBINANT ADJUVANTED    Other orders  -     metoprolol succinate (TOPROL-XL) 50 mg XL tablet; Take two 25 mg tablets daily      Follow-up and Dispositions    · Return in about 1 year (around 10/29/2022) for cpe.

## 2021-10-29 NOTE — PATIENT INSTRUCTIONS
Office Policies    Phone calls/patient messages:            Please allow up to 24 hours for someone in the office to contact you about your call or message. Be mindful your provider may be out of the office or your message may require further review. We encourage you to use [facility] for your messages as this is a faster, more efficient way to communicate with our office                         Medication Refills:            Prescription medications require 48-72 business hours to process. We encourage you to use [facility] for your refills. For controlled medications: Please allow 72 business hours to process. Certain medications may require you to  a written prescription at our office. NO narcotic/controlled medications will be prescribed after 4pm Monday through Friday or on weekends              Form/Paperwork Completion:            Please note a $25 fee may incur for all paperwork for completed by our providers. We ask that you allow 7-10 business days. Pre-payment is due prior to picking up/faxing the completed form. You may also download your forms to [facility] to have your doctor print off.

## 2021-10-29 NOTE — TELEPHONE ENCOUNTER
Future Appointments:  No future appointments. Last Appointment With Me:  10/29/2021     Requested Prescriptions     Pending Prescriptions Disp Refills    amLODIPine (NORVASC) 5 mg tablet 90 Tablet 3     Sig: Take 1 Tablet by mouth daily.  metoprolol succinate (TOPROL-XL) 25 mg XL tablet 180 Tablet 3     Sig: Take two 25 mg tablets daily    naproxen (NAPROSYN) 500 mg tablet 30 Tablet 1     Sig: Take 1 Tablet by mouth two (2) times daily (with meals).

## 2021-10-30 LAB
ALBUMIN SERPL-MCNC: 4.3 G/DL (ref 3.8–4.9)
ALBUMIN/GLOB SERPL: 1.5 {RATIO} (ref 1.2–2.2)
ALP SERPL-CCNC: 91 IU/L (ref 44–121)
ALT SERPL-CCNC: 42 IU/L (ref 0–44)
AST SERPL-CCNC: 26 IU/L (ref 0–40)
BILIRUB SERPL-MCNC: 0.4 MG/DL (ref 0–1.2)
BUN SERPL-MCNC: 15 MG/DL (ref 6–24)
BUN/CREAT SERPL: 14 (ref 9–20)
CALCIUM SERPL-MCNC: 9.4 MG/DL (ref 8.7–10.2)
CHLORIDE SERPL-SCNC: 106 MMOL/L (ref 96–106)
CHOLEST SERPL-MCNC: 179 MG/DL (ref 100–199)
CO2 SERPL-SCNC: 21 MMOL/L (ref 20–29)
CREAT SERPL-MCNC: 1.06 MG/DL (ref 0.76–1.27)
ERYTHROCYTE [DISTWIDTH] IN BLOOD BY AUTOMATED COUNT: 12.6 % (ref 11.6–15.4)
EST. AVERAGE GLUCOSE BLD GHB EST-MCNC: 134 MG/DL
GLOBULIN SER CALC-MCNC: 2.9 G/DL (ref 1.5–4.5)
GLUCOSE SERPL-MCNC: 115 MG/DL (ref 65–99)
HBA1C MFR BLD: 6.3 % (ref 4.8–5.6)
HCT VFR BLD AUTO: 49.1 % (ref 37.5–51)
HDLC SERPL-MCNC: 41 MG/DL
HGB BLD-MCNC: 17 G/DL (ref 13–17.7)
LDLC SERPL CALC-MCNC: 117 MG/DL (ref 0–99)
MCH RBC QN AUTO: 32.8 PG (ref 26.6–33)
MCHC RBC AUTO-ENTMCNC: 34.6 G/DL (ref 31.5–35.7)
MCV RBC AUTO: 95 FL (ref 79–97)
PLATELET # BLD AUTO: 322 X10E3/UL (ref 150–450)
POTASSIUM SERPL-SCNC: 4.4 MMOL/L (ref 3.5–5.2)
PROT SERPL-MCNC: 7.2 G/DL (ref 6–8.5)
PSA SERPL-MCNC: 0.9 NG/ML (ref 0–4)
RBC # BLD AUTO: 5.18 X10E6/UL (ref 4.14–5.8)
REFLEX CRITERIA: NORMAL
SODIUM SERPL-SCNC: 140 MMOL/L (ref 134–144)
TRIGL SERPL-MCNC: 117 MG/DL (ref 0–149)
TSH SERPL DL<=0.005 MIU/L-ACNC: 1.87 UIU/ML (ref 0.45–4.5)
VLDLC SERPL CALC-MCNC: 21 MG/DL (ref 5–40)
WBC # BLD AUTO: 9.4 X10E3/UL (ref 3.4–10.8)

## 2022-03-19 PROBLEM — E66.9 OBESITY (BMI 30.0-34.9): Status: ACTIVE | Noted: 2018-10-04

## 2022-03-19 PROBLEM — E78.00 PURE HYPERCHOLESTEROLEMIA: Status: ACTIVE | Noted: 2018-10-04

## 2022-03-19 PROBLEM — E66.811 OBESITY (BMI 30.0-34.9): Status: ACTIVE | Noted: 2018-10-04

## 2022-03-20 PROBLEM — M62.08 DIASTASIS RECTI: Status: ACTIVE | Noted: 2017-03-31

## 2022-03-29 NOTE — PROGRESS NOTES
HISTORY OF PRESENT ILLNESS  Mariana Ranadll is a 59 y.o. male. HPI    F/u HTN HLD GERD cervical radicular pain Anxiety , Hyperglycemia . Palpitations  Anxiety -controlled without medication  Palpitations-controlled on BB , occurs maybe < 1 week  Last a1c 6.3 -  Taking lipitor daily-some increased achiness of wrists and knuckles  Declines LDCT but will consider for future per pt  Last OV    Anxiety-controled  Palpitations--still has some palpitations maybe once per week-no change in past 2-3 years  Tobacco-quit tobacco 6 yrs ago then vaped 1 yr then quit  Weight up  abotu 14 lbs since last visit in office    Patient Active Problem List    Diagnosis Date Noted    Pure hypercholesterolemia 10/04/2018    Obesity (BMI 30.0-34.9) 10/04/2018    Diastasis recti 03/31/2017    Essential hypertension 10/18/2016    Chest pain in adult 10/18/2016    Thyroid nodule 10/04/2016    Heart palpitations 03/24/2016     Current Outpatient Medications   Medication Sig Dispense Refill    atorvastatin (LIPITOR) 10 mg tablet Take 1 tablet by mouth once daily 90 Tablet 3    amLODIPine (NORVASC) 5 mg tablet Take 1 Tablet by mouth daily. 90 Tablet 3    naproxen (NAPROSYN) 500 mg tablet Take 1 Tablet by mouth two (2) times daily (with meals). 30 Tablet 1    metoprolol succinate (TOPROL-XL) 50 mg XL tablet Take two 25 mg tablets daily 90 Tablet 3    acetaminophen (TYLENOL EXTRA STRENGTH) 500 mg tablet Take  by mouth every six (6) hours as needed for Pain.  calcium carbonate (TUMS) 200 mg calcium (500 mg) chew Take 1 Tab by mouth as needed.        Allergies   Allergen Reactions    Iodinated Contrast Media Other (comments)     \"resp reaction\"    Pcn [Penicillins] Unknown (comments)    Stings [Sting, Bee] Swelling      Lab Results   Component Value Date/Time    WBC 9.4 10/29/2021 10:40 AM    HGB 17.0 10/29/2021 10:40 AM    HCT 49.1 10/29/2021 10:40 AM    PLATELET 675 87/95/4283 10:40 AM    MCV 95 10/29/2021 10:40 AM     Lab Results   Component Value Date/Time    Hemoglobin A1c 6.3 (H) 10/29/2021 10:40 AM    Hemoglobin A1c 5.8 (H) 09/27/2019 09:33 AM    Hemoglobin A1c 6.1 (H) 03/29/2019 08:33 AM    Glucose 115 (H) 10/29/2021 10:40 AM    LDL, calculated 117 (H) 10/29/2021 10:40 AM    LDL, calculated 90 09/27/2019 09:33 AM    Creatinine 1.06 10/29/2021 10:40 AM      Lab Results   Component Value Date/Time    Cholesterol, total 179 10/29/2021 10:40 AM    HDL Cholesterol 41 10/29/2021 10:40 AM    LDL, calculated 117 (H) 10/29/2021 10:40 AM    LDL, calculated 90 09/27/2019 09:33 AM    Triglyceride 117 10/29/2021 10:40 AM     Lab Results   Component Value Date/Time    GFR est non-AA 77 10/29/2021 10:40 AM    GFR est AA 89 10/29/2021 10:40 AM    Creatinine 1.06 10/29/2021 10:40 AM    BUN 15 10/29/2021 10:40 AM    Sodium 140 10/29/2021 10:40 AM    Potassium 4.4 10/29/2021 10:40 AM    Chloride 106 10/29/2021 10:40 AM    CO2 21 10/29/2021 10:40 AM    Magnesium 2.0 09/23/2016 04:40 PM        ROS    Physical Exam  Vitals and nursing note reviewed. Constitutional:       General: He is not in acute distress. Appearance: Normal appearance. He is well-developed. He is obese. Comments: Appears stated age   HENT:      Head: Normocephalic. Cardiovascular:      Rate and Rhythm: Normal rate and regular rhythm. Heart sounds: Normal heart sounds. Pulmonary:      Effort: Pulmonary effort is normal.      Breath sounds: Normal breath sounds. Abdominal:      Palpations: Abdomen is soft. Neurological:      Mental Status: He is alert. ASSESSMENT and PLAN  Diagnoses and all orders for this visit:    1. Prediabetes  -     HEMOGLOBIN A1C WITH EAG; Future   Weight reduction recommended  2. Hypertension, unspecified type   controlled  3. Pure hypercholesterolemia  -     LIPID PANEL; Future  -     CK; Future   On statin daily now  4.  Encounter for immunization  -     ZOSTER VACC RECOMBINANT ADJUVANTED      Follow-up and Dispositions · Return in about 6 months (around 9/30/2022) for CPE.

## 2022-03-30 ENCOUNTER — OFFICE VISIT (OUTPATIENT)
Dept: INTERNAL MEDICINE CLINIC | Age: 57
End: 2022-03-30
Payer: COMMERCIAL

## 2022-03-30 VITALS
HEIGHT: 74 IN | SYSTOLIC BLOOD PRESSURE: 132 MMHG | HEART RATE: 76 BPM | WEIGHT: 250 LBS | DIASTOLIC BLOOD PRESSURE: 82 MMHG | RESPIRATION RATE: 16 BRPM | TEMPERATURE: 97.2 F | BODY MASS INDEX: 32.08 KG/M2 | OXYGEN SATURATION: 95 %

## 2022-03-30 DIAGNOSIS — R73.03 PREDIABETES: Primary | ICD-10-CM

## 2022-03-30 DIAGNOSIS — E78.00 PURE HYPERCHOLESTEROLEMIA: ICD-10-CM

## 2022-03-30 DIAGNOSIS — Z23 ENCOUNTER FOR IMMUNIZATION: ICD-10-CM

## 2022-03-30 DIAGNOSIS — I10 HYPERTENSION, UNSPECIFIED TYPE: ICD-10-CM

## 2022-03-30 PROCEDURE — 90750 HZV VACC RECOMBINANT IM: CPT | Performed by: INTERNAL MEDICINE

## 2022-03-30 PROCEDURE — 90471 IMMUNIZATION ADMIN: CPT | Performed by: INTERNAL MEDICINE

## 2022-03-30 PROCEDURE — 99213 OFFICE O/P EST LOW 20 MIN: CPT | Performed by: INTERNAL MEDICINE

## 2022-03-30 NOTE — PROGRESS NOTES
1. \"Have you been to the ER, urgent care clinic since your last visit? Hospitalized since your last visit? \" No    2. \"Have you seen or consulted any other health care providers outside of the [address] [place] [address] since your last visit? \" no    3. For patients aged 39-70: Has the patient had a colonoscopy / FIT/ Cologuard?  Yes

## 2022-03-30 NOTE — PROGRESS NOTES
Adelaide Marsh is a 59 y.o. male who presents for routine immunizations. He denies any symptoms , reactions or allergies that would exclude them from being immunized today. Risks and adverse reactions were discussed and the VIS was given to them. All questions were addressed. He was observed for 10 min post injection. There were no reactions observed.     Jasiel Ramsay LPN

## 2022-03-30 NOTE — PATIENT INSTRUCTIONS
Office Policies    Phone calls/patient messages:            Please allow up to 24 hours for someone in the office to contact you about your call or message. Be mindful your provider may be out of the office or your message may require further review. We encourage you to use [employer] for your messages as this is a faster, more efficient way to communicate with our office                         Medication Refills:            Prescription medications require 48-72 business hours to process. We encourage you to use [employer]choices for your refills. For controlled medications: Please allow 72 business hours to process. Certain medications may require you to  a written prescription at our office. NO narcotic/controlled medications will be prescribed after 4pm Monday through Friday or on weekends              Form/Paperwork Completion:            Please note a $25 fee may incur for all paperwork for completed by our providers. We ask that you allow 7-10 business days. Pre-payment is due prior to picking up/faxing the completed form. You may also download your forms to [employer] to have your doctor print off.

## 2022-03-31 LAB
CHOLEST SERPL-MCNC: 143 MG/DL (ref 100–199)
CK SERPL-CCNC: 351 U/L (ref 41–331)
EST. AVERAGE GLUCOSE BLD GHB EST-MCNC: 128 MG/DL
HBA1C MFR BLD: 6.1 % (ref 4.8–5.6)
HDLC SERPL-MCNC: 41 MG/DL
LDLC SERPL CALC-MCNC: 85 MG/DL (ref 0–99)
TRIGL SERPL-MCNC: 91 MG/DL (ref 0–149)
VLDLC SERPL CALC-MCNC: 17 MG/DL (ref 5–40)

## 2022-10-27 ENCOUNTER — TELEPHONE (OUTPATIENT)
Dept: INTERNAL MEDICINE CLINIC | Age: 57
End: 2022-10-27

## 2022-10-27 NOTE — TELEPHONE ENCOUNTER
----- Message from Tesha Chaidez sent at 10/27/2022  3:10 PM EDT -----  Subject: Appointment Request    Reason for Call: Established Patient Appointment needed: Routine Physical   Exam    QUESTIONS    Reason for appointment request? No appointments available during search     Additional Information for Provider? Pt needs to reschedule his cpe that   was scheduled for 10/31. He would like to schedule for a Thursday or   Friday though until a week after Thanksgiving then he would only be off on   Friday until after new year. He would want a morning appt since he will be   fasting.  Please call pt to schedule or you can text the pt as well.   ---------------------------------------------------------------------------  --------------  [address] [facility]  [phone]; OK to leave message on voicemail  ---------------------------------------------------------------------------  --------------  SCRIPT ANSWERS  COVID Screen: Yasmine Tompkins

## 2022-12-29 NOTE — PROGRESS NOTES
HISTORY OF PRESENT ILLNESS  Mariana Randall is a 59 y.o. male. HPI   F/u HTN HLD GERD cervical radicular pain Anxiety , Hyperglycemia . Palpitations and CPE  Rare home readings  Palpitations are quiet per pt  Anxiety controlled  Former smoker-quiet 2015  Etoh-none  Feels ok -sometimes feels flushed or lightheaded mildly  Walks all day at [employer] [address] at home  Last OV  Anxiety -controlled without medication  Palpitations-controlled on BB , occurs maybe < 1 week  Last a1c 6.3 -  Taking lipitor daily-some increased achiness of wrists and knuckles  Declines LDCT but will consider for future per pt    Patient Active Problem List    Diagnosis Date Noted    Pure hypercholesterolemia 10/04/2018    Obesity (BMI 30.0-34.9) 10/04/2018    Diastasis recti 03/31/2017    Essential hypertension 10/18/2016    Chest pain in adult 10/18/2016    Thyroid nodule 10/04/2016    Heart palpitations 03/24/2016     Current Outpatient Medications   Medication Sig Dispense Refill    atorvastatin (LIPITOR) 10 mg tablet Take 1 tablet by mouth once daily 90 Tablet 3    amLODIPine (NORVASC) 5 mg tablet Take 1 tablet by mouth once daily 90 Tablet 3    metoprolol succinate (TOPROL-XL) 25 mg XL tablet Take 2 tablets by mouth once daily 180 Tablet 3    naproxen (NAPROSYN) 500 mg tablet Take 1 Tablet by mouth two (2) times daily (with meals). 30 Tablet 1    metoprolol succinate (TOPROL-XL) 50 mg XL tablet Take two 25 mg tablets daily 90 Tablet 3    acetaminophen (TYLENOL EXTRA STRENGTH) 500 mg tablet Take  by mouth every six (6) hours as needed for Pain. calcium carbonate (TUMS) 200 mg calcium (500 mg) chew Take 1 Tab by mouth as needed.  (Patient not taking: Reported on 3/30/2022)       Allergies   Allergen Reactions    Iodinated Contrast Media Other (comments)     \"resp reaction\"    Pcn [Penicillins] Unknown (comments)    Stings [Sting, Bee] Swelling      Lab Results   Component Value Date/Time    WBC 9.4 10/29/2021 10:40 AM    HGB 17.0 10/29/2021 10:40 AM    HCT 49.1 10/29/2021 10:40 AM    PLATELET 294 85/14/9683 10:40 AM    MCV 95 10/29/2021 10:40 AM     Lab Results   Component Value Date/Time    Hemoglobin A1c 6.1 (H) 03/30/2022 12:00 AM    Hemoglobin A1c 6.3 (H) 10/29/2021 10:40 AM    Hemoglobin A1c 5.8 (H) 09/27/2019 09:33 AM    Glucose 115 (H) 10/29/2021 10:40 AM    LDL, calculated 85 03/30/2022 12:00 AM    LDL, calculated 90 09/27/2019 09:33 AM    Creatinine 1.06 10/29/2021 10:40 AM      Lab Results   Component Value Date/Time    Cholesterol, total 143 03/30/2022 12:00 AM    HDL Cholesterol 41 03/30/2022 12:00 AM    LDL, calculated 85 03/30/2022 12:00 AM    LDL, calculated 90 09/27/2019 09:33 AM    Triglyceride 91 03/30/2022 12:00 AM     Lab Results   Component Value Date/Time    GFR est non-AA 77 10/29/2021 10:40 AM    GFR est AA 89 10/29/2021 10:40 AM    Creatinine 1.06 10/29/2021 10:40 AM    BUN 15 10/29/2021 10:40 AM    Sodium 140 10/29/2021 10:40 AM    Potassium 4.4 10/29/2021 10:40 AM    Chloride 106 10/29/2021 10:40 AM    CO2 21 10/29/2021 10:40 AM    Magnesium 2.0 09/23/2016 04:40 PM     Lab Results   Component Value Date/Time    TSH 1.870 10/29/2021 10:40 AM    T4, Free 1.1 03/21/2016 10:21 PM      Lab Results   Component Value Date/Time    Glucose 115 (H) 10/29/2021 10:40 AM         Review of Systems   Constitutional:  Negative for chills, fever, malaise/fatigue and weight loss. HENT: Negative. Eyes:  Negative for blurred vision and double vision. Respiratory:  Negative for cough and shortness of breath. Cardiovascular:  Negative for chest pain and palpitations. Gastrointestinal:  Negative for abdominal pain, blood in stool, constipation, diarrhea, melena, nausea and vomiting. Genitourinary:  Negative for dysuria, frequency, hematuria and urgency. Musculoskeletal:  Negative for back pain, falls, joint pain and myalgias. Skin: Negative. Neurological:  Negative for dizziness, tremors and headaches. Endo/Heme/Allergies: Negative. Psychiatric/Behavioral: Negative. Physical Exam  Vitals and nursing note reviewed. Constitutional:       General: He is not in acute distress. Appearance: Normal appearance. He is well-developed. He is obese. Comments: Appears stated age   HENT:      Head: Normocephalic. Right Ear: Tympanic membrane normal.      Left Ear: Tympanic membrane normal.   Neck:      Vascular: No carotid bruit. Cardiovascular:      Rate and Rhythm: Normal rate and regular rhythm. Heart sounds: Normal heart sounds. Pulmonary:      Effort: Pulmonary effort is normal.      Breath sounds: Normal breath sounds. Abdominal:      Palpations: Abdomen is soft. Musculoskeletal:      Cervical back: Normal range of motion and neck supple. Right lower leg: No edema. Left lower leg: No edema. Lymphadenopathy:      Cervical: No cervical adenopathy. Skin:     General: Skin is warm. Neurological:      General: No focal deficit present. Mental Status: He is alert. Psychiatric:         Mood and Affect: Mood normal.         Behavior: Behavior normal.         Thought Content: Thought content normal.         Judgment: Judgment normal.       ASSESSMENT and PLAN    ICD-10-CM ICD-9-CM    1. Routine general medical examination at a health care facility  Z00.00 V70.0 PSA W/ REFLX FREE PSA      CBC W/O DIFF      METABOLIC PANEL, COMPREHENSIVE      LIPID PANEL      TSH 3RD GENERATION      HEMOGLOBIN A1C WITH EAG      URINALYSIS W/ RFLX MICROSCOPIC      PSA W/ REFLX FREE PSA      CBC W/O DIFF      METABOLIC PANEL, COMPREHENSIVE      LIPID PANEL      TSH 3RD GENERATION      HEMOGLOBIN A1C WITH EAG      URINALYSIS W/ RFLX MICROSCOPIC  Pt will get covid booster  Declines  Flu shot  Declines LDCT      2. Hypertension, unspecified type  I10 401.9 Reasonable control      3. Hyperlipidemia, unspecified hyperlipidemia type  E78.5 272.4 On statin      4.  Prediabetes  R73.03 790.29 Weight reduction recommended      5. Anxiety  F41.9 300.00 Controlled without medication      6.  Palpitation  R00.2 785.1 Controlled on BB   RTC 1 year CPE

## 2022-12-30 ENCOUNTER — OFFICE VISIT (OUTPATIENT)
Dept: INTERNAL MEDICINE CLINIC | Age: 57
End: 2022-12-30
Payer: COMMERCIAL

## 2022-12-30 VITALS
TEMPERATURE: 97.2 F | SYSTOLIC BLOOD PRESSURE: 136 MMHG | DIASTOLIC BLOOD PRESSURE: 84 MMHG | BODY MASS INDEX: 33.74 KG/M2 | WEIGHT: 254.6 LBS | RESPIRATION RATE: 16 BRPM | OXYGEN SATURATION: 97 % | HEIGHT: 73 IN | HEART RATE: 81 BPM

## 2022-12-30 DIAGNOSIS — R73.03 PREDIABETES: ICD-10-CM

## 2022-12-30 DIAGNOSIS — E78.5 HYPERLIPIDEMIA, UNSPECIFIED HYPERLIPIDEMIA TYPE: ICD-10-CM

## 2022-12-30 DIAGNOSIS — I10 HYPERTENSION, UNSPECIFIED TYPE: ICD-10-CM

## 2022-12-30 DIAGNOSIS — F41.9 ANXIETY: ICD-10-CM

## 2022-12-30 DIAGNOSIS — R00.2 PALPITATION: ICD-10-CM

## 2022-12-30 DIAGNOSIS — Z00.00 ROUTINE GENERAL MEDICAL EXAMINATION AT A HEALTH CARE FACILITY: Primary | ICD-10-CM

## 2022-12-30 NOTE — PROGRESS NOTES
1. Have you been to the ER, urgent care clinic since your last visit? Hospitalized since your last visit? No    2. Have you seen or consulted any other health care providers outside of the [address] [place] [address] since your last visit? Include any pap smears or colon screening.  No

## 2022-12-31 LAB
ALBUMIN SERPL-MCNC: 4 G/DL (ref 3.8–4.9)
ALBUMIN/GLOB SERPL: 1.3 {RATIO} (ref 1.2–2.2)
ALP SERPL-CCNC: 95 IU/L (ref 44–121)
ALT SERPL-CCNC: 45 IU/L (ref 0–44)
APPEARANCE UR: CLEAR
AST SERPL-CCNC: 29 IU/L (ref 0–40)
BACTERIA #/AREA URNS HPF: NORMAL /[HPF]
BILIRUB SERPL-MCNC: 0.6 MG/DL (ref 0–1.2)
BILIRUB UR QL STRIP: NEGATIVE
BUN SERPL-MCNC: 17 MG/DL (ref 6–24)
BUN/CREAT SERPL: 19 (ref 9–20)
CALCIUM SERPL-MCNC: 9 MG/DL (ref 8.7–10.2)
CASTS URNS QL MICRO: NORMAL /LPF
CHLORIDE SERPL-SCNC: 106 MMOL/L (ref 96–106)
CHOLEST SERPL-MCNC: 153 MG/DL (ref 100–199)
CO2 SERPL-SCNC: 23 MMOL/L (ref 20–29)
COLOR UR: YELLOW
CREAT SERPL-MCNC: 0.91 MG/DL (ref 0.76–1.27)
EGFR: 97 ML/MIN/1.73
EPI CELLS #/AREA URNS HPF: NORMAL /HPF (ref 0–10)
ERYTHROCYTE [DISTWIDTH] IN BLOOD BY AUTOMATED COUNT: 12.5 % (ref 11.6–15.4)
EST. AVERAGE GLUCOSE BLD GHB EST-MCNC: 131 MG/DL
GLOBULIN SER CALC-MCNC: 3 G/DL (ref 1.5–4.5)
GLUCOSE SERPL-MCNC: 109 MG/DL (ref 70–99)
GLUCOSE UR QL STRIP: NEGATIVE
HBA1C MFR BLD: 6.2 % (ref 4.8–5.6)
HCT VFR BLD AUTO: 45.1 % (ref 37.5–51)
HDLC SERPL-MCNC: 38 MG/DL
HGB BLD-MCNC: 16.1 G/DL (ref 13–17.7)
HGB UR QL STRIP: NEGATIVE
KETONES UR QL STRIP: NEGATIVE
LDLC SERPL CALC-MCNC: 93 MG/DL (ref 0–99)
LEUKOCYTE ESTERASE UR QL STRIP: NEGATIVE
MCH RBC QN AUTO: 32.4 PG (ref 26.6–33)
MCHC RBC AUTO-ENTMCNC: 35.7 G/DL (ref 31.5–35.7)
MCV RBC AUTO: 91 FL (ref 79–97)
MICRO URNS: ABNORMAL
NITRITE UR QL STRIP: NEGATIVE
PH UR STRIP: 6.5 [PH] (ref 5–7.5)
PLATELET # BLD AUTO: 292 X10E3/UL (ref 150–450)
POTASSIUM SERPL-SCNC: 4.2 MMOL/L (ref 3.5–5.2)
PROT SERPL-MCNC: 7 G/DL (ref 6–8.5)
PROT UR QL STRIP: ABNORMAL
PSA SERPL-MCNC: 0.7 NG/ML (ref 0–4)
RBC # BLD AUTO: 4.97 X10E6/UL (ref 4.14–5.8)
RBC #/AREA URNS HPF: NORMAL /HPF (ref 0–2)
REFLEX CRITERIA: NORMAL
SODIUM SERPL-SCNC: 141 MMOL/L (ref 134–144)
SP GR UR STRIP: 1.01 (ref 1–1.03)
TRIGL SERPL-MCNC: 120 MG/DL (ref 0–149)
TSH SERPL DL<=0.005 MIU/L-ACNC: 1.61 UIU/ML (ref 0.45–4.5)
UROBILINOGEN UR STRIP-MCNC: 0.2 MG/DL (ref 0.2–1)
VLDLC SERPL CALC-MCNC: 22 MG/DL (ref 5–40)
WBC # BLD AUTO: 8.7 X10E3/UL (ref 3.4–10.8)
WBC #/AREA URNS HPF: NORMAL /HPF (ref 0–5)

## 2023-05-20 RX ORDER — ATORVASTATIN CALCIUM 10 MG/1
1 TABLET, FILM COATED ORAL DAILY
COMMUNITY
Start: 2022-12-08

## 2023-05-20 RX ORDER — METOPROLOL SUCCINATE 25 MG/1
2 TABLET, EXTENDED RELEASE ORAL DAILY
COMMUNITY
Start: 2022-09-11

## 2023-05-20 RX ORDER — ACETAMINOPHEN 500 MG
TABLET ORAL EVERY 6 HOURS PRN
COMMUNITY

## 2023-05-20 RX ORDER — NAPROXEN 500 MG/1
500 TABLET ORAL 2 TIMES DAILY WITH MEALS
COMMUNITY
Start: 2021-10-29

## 2023-05-20 RX ORDER — AMLODIPINE BESYLATE 5 MG/1
1 TABLET ORAL DAILY
COMMUNITY
Start: 2022-09-11

## 2023-05-20 RX ORDER — UREA 10 %
1 LOTION (ML) TOPICAL PRN
COMMUNITY

## 2023-08-25 RX ORDER — AMLODIPINE BESYLATE 5 MG/1
TABLET ORAL
Qty: 90 TABLET | Refills: 0 | Status: SHIPPED | OUTPATIENT
Start: 2023-08-25

## 2023-08-25 RX ORDER — METOPROLOL SUCCINATE 25 MG/1
TABLET, EXTENDED RELEASE ORAL
Qty: 180 TABLET | Refills: 0 | Status: SHIPPED | OUTPATIENT
Start: 2023-08-25

## 2023-11-21 RX ORDER — METOPROLOL SUCCINATE 25 MG/1
TABLET, EXTENDED RELEASE ORAL
Qty: 180 TABLET | Refills: 3 | Status: SHIPPED | OUTPATIENT
Start: 2023-11-21

## 2023-11-21 RX ORDER — AMLODIPINE BESYLATE 5 MG/1
TABLET ORAL
Qty: 90 TABLET | Refills: 3 | Status: SHIPPED | OUTPATIENT
Start: 2023-11-21

## 2023-11-21 RX ORDER — ATORVASTATIN CALCIUM 10 MG/1
10 TABLET, FILM COATED ORAL DAILY
Qty: 90 TABLET | Refills: 3 | Status: SHIPPED | OUTPATIENT
Start: 2023-11-21

## 2023-12-15 ENCOUNTER — OFFICE VISIT (OUTPATIENT)
Age: 58
End: 2023-12-15
Payer: COMMERCIAL

## 2023-12-15 VITALS
SYSTOLIC BLOOD PRESSURE: 140 MMHG | BODY MASS INDEX: 32.29 KG/M2 | TEMPERATURE: 97.2 F | OXYGEN SATURATION: 95 % | RESPIRATION RATE: 16 BRPM | DIASTOLIC BLOOD PRESSURE: 85 MMHG | HEART RATE: 78 BPM | WEIGHT: 243.6 LBS | HEIGHT: 73 IN

## 2023-12-15 DIAGNOSIS — K21.9 GASTROESOPHAGEAL REFLUX DISEASE, UNSPECIFIED WHETHER ESOPHAGITIS PRESENT: ICD-10-CM

## 2023-12-15 DIAGNOSIS — R73.09 ELEVATED GLUCOSE: ICD-10-CM

## 2023-12-15 DIAGNOSIS — Z00.00 ROUTINE PHYSICAL EXAMINATION: Primary | ICD-10-CM

## 2023-12-15 DIAGNOSIS — I10 HYPERTENSION, UNSPECIFIED TYPE: ICD-10-CM

## 2023-12-15 DIAGNOSIS — E78.5 HYPERLIPIDEMIA, UNSPECIFIED HYPERLIPIDEMIA TYPE: ICD-10-CM

## 2023-12-15 DIAGNOSIS — F41.9 ANXIETY: ICD-10-CM

## 2023-12-15 PROCEDURE — 3077F SYST BP >= 140 MM HG: CPT | Performed by: INTERNAL MEDICINE

## 2023-12-15 PROCEDURE — 3079F DIAST BP 80-89 MM HG: CPT | Performed by: INTERNAL MEDICINE

## 2023-12-15 PROCEDURE — 99396 PREV VISIT EST AGE 40-64: CPT | Performed by: INTERNAL MEDICINE

## 2023-12-15 SDOH — ECONOMIC STABILITY: FOOD INSECURITY: WITHIN THE PAST 12 MONTHS, THE FOOD YOU BOUGHT JUST DIDN'T LAST AND YOU DIDN'T HAVE MONEY TO GET MORE.: NEVER TRUE

## 2023-12-15 SDOH — ECONOMIC STABILITY: INCOME INSECURITY: HOW HARD IS IT FOR YOU TO PAY FOR THE VERY BASICS LIKE FOOD, HOUSING, MEDICAL CARE, AND HEATING?: NOT HARD AT ALL

## 2023-12-15 SDOH — ECONOMIC STABILITY: FOOD INSECURITY: WITHIN THE PAST 12 MONTHS, YOU WORRIED THAT YOUR FOOD WOULD RUN OUT BEFORE YOU GOT MONEY TO BUY MORE.: NEVER TRUE

## 2023-12-15 SDOH — ECONOMIC STABILITY: HOUSING INSECURITY
IN THE LAST 12 MONTHS, WAS THERE A TIME WHEN YOU DID NOT HAVE A STEADY PLACE TO SLEEP OR SLEPT IN A SHELTER (INCLUDING NOW)?: NO

## 2023-12-15 ASSESSMENT — PATIENT HEALTH QUESTIONNAIRE - PHQ9
SUM OF ALL RESPONSES TO PHQ QUESTIONS 1-9: 0
1. LITTLE INTEREST OR PLEASURE IN DOING THINGS: 0
SUM OF ALL RESPONSES TO PHQ QUESTIONS 1-9: 0
SUM OF ALL RESPONSES TO PHQ9 QUESTIONS 1 & 2: 0
SUM OF ALL RESPONSES TO PHQ QUESTIONS 1-9: 0
SUM OF ALL RESPONSES TO PHQ QUESTIONS 1-9: 0
2. FEELING DOWN, DEPRESSED OR HOPELESS: 0

## 2023-12-15 NOTE — PROGRESS NOTES
1. \"Have you been to the ER, urgent care clinic since your last visit? Hospitalized since your last visit? \" No    2. \"Have you seen or consulted any other health care providers outside of the [address] [place] [address] since your last visit? \" No     3. For patients aged 43-73: Has the patient had a colonoscopy / FIT/ Cologuard?  2016 gonzales
range of motion. Cervical back: Normal range of motion. Right lower leg: No edema. Neurological:      General: No focal deficit present. Mental Status: He is alert. Psychiatric:         Mood and Affect: Mood normal.         Behavior: Behavior normal.         Thought Content: Thought content normal.         Judgment: Judgment normal.        ASSESSMENT and PLAN   Karen was seen today for annual exam.    Diagnoses and all orders for this visit:    Routine physical examination  -     CBC; Future  -     Comprehensive Metabolic Panel; Future  -     Lipid Panel; Future  -     TSH; Future  -     Total PSA with Free PSA Reflex; Future  -     Total PSA with Free PSA Reflex  -     TSH  -     Lipid Panel  -     Comprehensive Metabolic Panel  -     CBC   Continue healthy lifestyle and weight reduction   Recommended rsv flu and covid shots  Hypertension, unspecified type  -     Lipid Panel;  Future  -     Lipid Panel   Reasonable control-continue medicines  Hyperlipidemia, unspecified hyperlipidemia type   On statin  Gastroesophageal reflux disease, unspecified whether esophagitis present   Controlled without medication  Anxiety   resolved  Elevated glucose  -     Hemoglobin A1C; Future  -     Hemoglobin A1C   Will continue diet and exercise     Rtc 9 months CPE and fu BP  Ethel Snellen, MD

## 2023-12-16 LAB
ALBUMIN SERPL-MCNC: 4 G/DL (ref 3.8–4.9)
ALBUMIN/GLOB SERPL: 1.4 {RATIO} (ref 1.2–2.2)
ALP SERPL-CCNC: 91 IU/L (ref 44–121)
ALT SERPL-CCNC: 40 IU/L (ref 0–44)
AST SERPL-CCNC: 26 IU/L (ref 0–40)
BILIRUB SERPL-MCNC: 0.4 MG/DL (ref 0–1.2)
BUN SERPL-MCNC: 20 MG/DL (ref 8–27)
BUN/CREAT SERPL: 19 (ref 10–24)
CALCIUM SERPL-MCNC: 9.2 MG/DL (ref 8.6–10.2)
CHLORIDE SERPL-SCNC: 105 MMOL/L (ref 96–106)
CHOLEST SERPL-MCNC: 148 MG/DL (ref 100–199)
CO2 SERPL-SCNC: 21 MMOL/L (ref 20–29)
CREAT SERPL-MCNC: 1.03 MG/DL (ref 0.76–1.27)
EGFRCR SERPLBLD CKD-EPI 2021: 83 ML/MIN/1.73
ERYTHROCYTE [DISTWIDTH] IN BLOOD BY AUTOMATED COUNT: 12.4 % (ref 11.6–15.4)
GLOBULIN SER CALC-MCNC: 2.9 G/DL (ref 1.5–4.5)
GLUCOSE SERPL-MCNC: 109 MG/DL (ref 70–99)
HBA1C MFR BLD: 6.2 % (ref 4.8–5.6)
HCT VFR BLD AUTO: 45.8 % (ref 37.5–51)
HDLC SERPL-MCNC: 39 MG/DL
HGB BLD-MCNC: 15.6 G/DL (ref 13–17.7)
LDLC SERPL CALC-MCNC: 94 MG/DL (ref 0–99)
MCH RBC QN AUTO: 32 PG (ref 26.6–33)
MCHC RBC AUTO-ENTMCNC: 34.1 G/DL (ref 31.5–35.7)
MCV RBC AUTO: 94 FL (ref 79–97)
PLATELET # BLD AUTO: 305 X10E3/UL (ref 150–450)
POTASSIUM SERPL-SCNC: 4.1 MMOL/L (ref 3.5–5.2)
PROT SERPL-MCNC: 6.9 G/DL (ref 6–8.5)
PSA SERPL-MCNC: 0.8 NG/ML (ref 0–4)
RBC # BLD AUTO: 4.88 X10E6/UL (ref 4.14–5.8)
REFLEX CRITERIA: NORMAL
SODIUM SERPL-SCNC: 140 MMOL/L (ref 134–144)
TRIGL SERPL-MCNC: 76 MG/DL (ref 0–149)
TSH SERPL DL<=0.005 MIU/L-ACNC: 1.64 UIU/ML (ref 0.45–4.5)
VLDLC SERPL CALC-MCNC: 15 MG/DL (ref 5–40)
WBC # BLD AUTO: 8.2 X10E3/UL (ref 3.4–10.8)

## 2024-09-12 ASSESSMENT — ENCOUNTER SYMPTOMS
EYES NEGATIVE: 1
RESPIRATORY NEGATIVE: 1

## 2024-09-13 ENCOUNTER — OFFICE VISIT (OUTPATIENT)
Age: 59
End: 2024-09-13
Payer: COMMERCIAL

## 2024-09-13 VITALS
SYSTOLIC BLOOD PRESSURE: 144 MMHG | HEART RATE: 88 BPM | OXYGEN SATURATION: 96 % | BODY MASS INDEX: 32.2 KG/M2 | TEMPERATURE: 97.7 F | HEIGHT: 73 IN | WEIGHT: 243 LBS | RESPIRATION RATE: 18 BRPM | DIASTOLIC BLOOD PRESSURE: 90 MMHG

## 2024-09-13 DIAGNOSIS — R73.03 PREDIABETES: ICD-10-CM

## 2024-09-13 DIAGNOSIS — E04.9 GOITER: ICD-10-CM

## 2024-09-13 DIAGNOSIS — E78.00 PURE HYPERCHOLESTEROLEMIA: ICD-10-CM

## 2024-09-13 DIAGNOSIS — I10 HYPERTENSION, UNSPECIFIED TYPE: ICD-10-CM

## 2024-09-13 DIAGNOSIS — Z00.00 ROUTINE PHYSICAL EXAMINATION: Primary | ICD-10-CM

## 2024-09-13 PROCEDURE — 3077F SYST BP >= 140 MM HG: CPT | Performed by: INTERNAL MEDICINE

## 2024-09-13 PROCEDURE — 99396 PREV VISIT EST AGE 40-64: CPT | Performed by: INTERNAL MEDICINE

## 2024-09-13 PROCEDURE — 3080F DIAST BP >= 90 MM HG: CPT | Performed by: INTERNAL MEDICINE

## 2024-09-13 RX ORDER — NAPROXEN 500 MG/1
500 TABLET ORAL 2 TIMES DAILY WITH MEALS
Qty: 60 TABLET | Refills: 0 | Status: SHIPPED | OUTPATIENT
Start: 2024-09-13

## 2024-09-13 RX ORDER — LOSARTAN POTASSIUM 25 MG/1
25 TABLET ORAL DAILY
Qty: 90 TABLET | Refills: 1 | Status: SHIPPED | OUTPATIENT
Start: 2024-09-13

## 2024-09-13 ASSESSMENT — PATIENT HEALTH QUESTIONNAIRE - PHQ9
SUM OF ALL RESPONSES TO PHQ QUESTIONS 1-9: 0
1. LITTLE INTEREST OR PLEASURE IN DOING THINGS: NOT AT ALL
SUM OF ALL RESPONSES TO PHQ QUESTIONS 1-9: 0
SUM OF ALL RESPONSES TO PHQ QUESTIONS 1-9: 0
2. FEELING DOWN, DEPRESSED OR HOPELESS: NOT AT ALL
SUM OF ALL RESPONSES TO PHQ9 QUESTIONS 1 & 2: 0
SUM OF ALL RESPONSES TO PHQ QUESTIONS 1-9: 0

## 2024-09-14 LAB
ALBUMIN SERPL-MCNC: 4.2 G/DL (ref 3.9–4.9)
ALP SERPL-CCNC: 97 IU/L (ref 44–121)
ALT SERPL-CCNC: 49 IU/L (ref 0–44)
AST SERPL-CCNC: 30 IU/L (ref 0–40)
BILIRUB SERPL-MCNC: 0.5 MG/DL (ref 0–1.2)
BUN SERPL-MCNC: 16 MG/DL (ref 8–27)
BUN/CREAT SERPL: 16 (ref 10–24)
CALCIUM SERPL-MCNC: 9.5 MG/DL (ref 8.6–10.2)
CHLORIDE SERPL-SCNC: 107 MMOL/L (ref 96–106)
CHOLEST SERPL-MCNC: 155 MG/DL (ref 100–199)
CO2 SERPL-SCNC: 21 MMOL/L (ref 20–29)
CREAT SERPL-MCNC: 1.03 MG/DL (ref 0.76–1.27)
EGFRCR SERPLBLD CKD-EPI 2021: 83 ML/MIN/1.73
ERYTHROCYTE [DISTWIDTH] IN BLOOD BY AUTOMATED COUNT: 12.5 % (ref 11.6–15.4)
GLOBULIN SER CALC-MCNC: 3 G/DL (ref 1.5–4.5)
GLUCOSE SERPL-MCNC: 114 MG/DL (ref 70–99)
HBA1C MFR BLD: 6.3 % (ref 4.8–5.6)
HCT VFR BLD AUTO: 48.3 % (ref 37.5–51)
HDLC SERPL-MCNC: 44 MG/DL
HGB BLD-MCNC: 16.4 G/DL (ref 13–17.7)
LDLC SERPL CALC-MCNC: 94 MG/DL (ref 0–99)
MCH RBC QN AUTO: 32.7 PG (ref 26.6–33)
MCHC RBC AUTO-ENTMCNC: 34 G/DL (ref 31.5–35.7)
MCV RBC AUTO: 96 FL (ref 79–97)
PLATELET # BLD AUTO: 274 X10E3/UL (ref 150–450)
POTASSIUM SERPL-SCNC: 4.7 MMOL/L (ref 3.5–5.2)
PROT SERPL-MCNC: 7.2 G/DL (ref 6–8.5)
PSA SERPL-MCNC: 0.7 NG/ML (ref 0–4)
RBC # BLD AUTO: 5.02 X10E6/UL (ref 4.14–5.8)
REFLEX CRITERIA: NORMAL
SODIUM SERPL-SCNC: 143 MMOL/L (ref 134–144)
TRIGL SERPL-MCNC: 90 MG/DL (ref 0–149)
TSH SERPL DL<=0.005 MIU/L-ACNC: 1.4 UIU/ML (ref 0.45–4.5)
VLDLC SERPL CALC-MCNC: 17 MG/DL (ref 5–40)
WBC # BLD AUTO: 8.6 X10E3/UL (ref 3.4–10.8)

## 2024-09-23 ENCOUNTER — HOSPITAL ENCOUNTER (OUTPATIENT)
Facility: HOSPITAL | Age: 59
Discharge: HOME | End: 2024-09-26
Attending: INTERNAL MEDICINE
Payer: COMMERCIAL

## 2024-09-23 DIAGNOSIS — E04.9 GOITER: ICD-10-CM

## 2024-09-23 PROCEDURE — 76536 US EXAM OF HEAD AND NECK: CPT

## 2024-10-02 DIAGNOSIS — E04.1 THYROID NODULE: Primary | ICD-10-CM

## 2024-10-04 ENCOUNTER — TELEPHONE (OUTPATIENT)
Age: 59
End: 2024-10-04

## 2024-10-04 NOTE — TELEPHONE ENCOUNTER
Spoke with patient. Advised. Verbalized understanding.   Referral faxed. Mailed to home address    Per Dr. Cisneros  Tell pt he has a very large right thyroid nodule 5 cm taking up the entire right lobe of the thyroid. Not sure if needs biopsy but due to large size recommend referral to [facility] to determine if needs needle aspiration or just observation-refer to [facility] please

## 2024-10-04 NOTE — TELEPHONE ENCOUNTER
Pt can not keep phone on him at work.  He is off work today.  Please try to reach him.         Pt states your trying to get in touch with him.

## 2024-10-30 RX ORDER — METOPROLOL SUCCINATE 25 MG/1
TABLET, EXTENDED RELEASE ORAL
Qty: 180 TABLET | Refills: 0 | Status: SHIPPED | OUTPATIENT
Start: 2024-10-30

## 2024-10-30 RX ORDER — ATORVASTATIN CALCIUM 10 MG/1
10 TABLET, FILM COATED ORAL DAILY
Qty: 90 TABLET | Refills: 0 | Status: SHIPPED | OUTPATIENT
Start: 2024-10-30

## 2024-11-04 RX ORDER — AMLODIPINE BESYLATE 5 MG/1
TABLET ORAL
Qty: 90 TABLET | Refills: 3 | Status: SHIPPED | OUTPATIENT
Start: 2024-11-04

## 2024-11-12 DIAGNOSIS — R53.83 FATIGUE, UNSPECIFIED TYPE: Primary | ICD-10-CM

## 2024-12-20 ENCOUNTER — OFFICE VISIT (OUTPATIENT)
Age: 59
End: 2024-12-20
Payer: COMMERCIAL

## 2024-12-20 VITALS
WEIGHT: 241 LBS | DIASTOLIC BLOOD PRESSURE: 80 MMHG | BODY MASS INDEX: 29.97 KG/M2 | TEMPERATURE: 97 F | HEIGHT: 75 IN | HEART RATE: 79 BPM | OXYGEN SATURATION: 96 % | SYSTOLIC BLOOD PRESSURE: 132 MMHG | RESPIRATION RATE: 17 BRPM

## 2024-12-20 DIAGNOSIS — E78.00 PURE HYPERCHOLESTEROLEMIA: ICD-10-CM

## 2024-12-20 DIAGNOSIS — I10 HYPERTENSION, UNSPECIFIED TYPE: ICD-10-CM

## 2024-12-20 DIAGNOSIS — R73.03 PREDIABETES: ICD-10-CM

## 2024-12-20 DIAGNOSIS — E04.1 THYROID NODULE: Primary | ICD-10-CM

## 2024-12-20 PROCEDURE — 3075F SYST BP GE 130 - 139MM HG: CPT | Performed by: INTERNAL MEDICINE

## 2024-12-20 PROCEDURE — 99214 OFFICE O/P EST MOD 30 MIN: CPT | Performed by: INTERNAL MEDICINE

## 2024-12-20 PROCEDURE — 3079F DIAST BP 80-89 MM HG: CPT | Performed by: INTERNAL MEDICINE

## 2024-12-20 SDOH — ECONOMIC STABILITY: FOOD INSECURITY: WITHIN THE PAST 12 MONTHS, THE FOOD YOU BOUGHT JUST DIDN'T LAST AND YOU DIDN'T HAVE MONEY TO GET MORE.: NEVER TRUE

## 2024-12-20 SDOH — ECONOMIC STABILITY: INCOME INSECURITY: HOW HARD IS IT FOR YOU TO PAY FOR THE VERY BASICS LIKE FOOD, HOUSING, MEDICAL CARE, AND HEATING?: NOT VERY HARD

## 2024-12-20 SDOH — ECONOMIC STABILITY: FOOD INSECURITY: WITHIN THE PAST 12 MONTHS, YOU WORRIED THAT YOUR FOOD WOULD RUN OUT BEFORE YOU GOT MONEY TO BUY MORE.: NEVER TRUE

## 2024-12-20 ASSESSMENT — PATIENT HEALTH QUESTIONNAIRE - PHQ9
SUM OF ALL RESPONSES TO PHQ QUESTIONS 1-9: 2
SUM OF ALL RESPONSES TO PHQ QUESTIONS 1-9: 2
2. FEELING DOWN, DEPRESSED OR HOPELESS: SEVERAL DAYS
SUM OF ALL RESPONSES TO PHQ9 QUESTIONS 1 & 2: 2
SUM OF ALL RESPONSES TO PHQ QUESTIONS 1-9: 2
SUM OF ALL RESPONSES TO PHQ QUESTIONS 1-9: 2
1. LITTLE INTEREST OR PLEASURE IN DOING THINGS: SEVERAL DAYS

## 2024-12-20 NOTE — PROGRESS NOTES
Patient identified with two identification factors, Name and Date of Birth.    Chief Complaint   Patient presents with    Follow-up     3 month follow-up        /80 (Site: Right Upper Arm, Position: Sitting, Cuff Size: Large Adult)   Pulse 79   Temp 97 °F (36.1 °C) (Temporal)   Resp 17   Ht 1.892 m (6' 2.5\")   Wt 109.3 kg (241 lb)   SpO2 96%   BMI 30.53 kg/m²       1. \"Have you been to the ER, urgent care clinic since your last visit?  Hospitalized since your last visit?\" No     2. \"Have you seen or consulted any other health care providers outside of the [facility] since your last visit?\" No     
range of motion.      Cervical back: Normal range of motion.      Right lower leg: No edema.   Neurological:      General: No focal deficit present.      Mental Status: He is alert.   Psychiatric:         Mood and Affect: Mood normal.         Behavior: Behavior normal.         Thought Content: Thought content normal.         Judgment: Judgment normal.          ASSESSMENT and PLAN  There are no diagnoses linked to this encounter.   Kieran was seen today for follow-up.    Diagnoses and all orders for this visit:    Thyroid nodule  -     AFL - Dung Pat MD, [facility] ([address]   Pt notes some enlargement in the past year but is asymptomatic and prefers not further evaluation at this time  Hypertension, unspecified type   Reasonable control-continue current medicines  Pure hypercholesterolemia   On statin-LDL at goal  Prediabetes   Weight reduction needed--d/w pt   Rtc 9 months CPE  Dwayne Cisneros MD

## 2024-12-23 NOTE — TELEPHONE ENCOUNTER
Patient states he needs a call or text in reference to getting something call in for his Sciatic Back Pain. Patient states Muscle relaxer is not working & possibly needs to discuss a Steroid Javier.      Patient states to call back at his work number of [phone] Or Text on Cell # of [phone]   Thank you [de-identified] : - thoroughly reviewed the etiology/ pathophysiology of the patient's complaints today in layman's terms - reviewed treatment options, conservative and operative as well as the indications for each - discussed conservative treatment options including the role for anti-inflammatory medications, injections, bracing and therapy - reviewed operative treatments including the Nirschl procedure and postoperative expectations - reviewed risks, benefits and alternatives to these - activity modifications reviewed - PT Rx provided - AAOS lateral epicondylitis exercises provided - could consider counterforce strap for comfort - NSAIDs prn pain, Mobic Rx sent today - f/u prn   My cumulative time spent on this patient's visit included: Preparation for the visit, review of the medical records, review of pertinent diagnostic studies, examination and counseling of the patient on the above diagnosis, treatment plan and prognosis, orders of diagnostic tests, medications and/or appropriate procedures and documentation in the medical records of today's visit. [de-identified] : - thoroughly reviewed the etiology/ pathophysiology of the patient's complaints today in layman's terms - reviewed treatment options, conservative and operative as well as the indications for each - discussed conservative treatment options including the role for anti-inflammatory medications, injections, bracing and therapy - reviewed operative treatments including the Nirschl procedure and postoperative expectations - reviewed risks, benefits and alternatives to these - activity modifications reviewed - PT Rx provided - AAOS lateral epicondylitis exercises provided - could consider counterforce strap for comfort - NSAIDs prn pain, Mobic Rx sent today - f/u prn   My cumulative time spent on this patient's visit included: Preparation for the visit, review of the medical records, review of pertinent diagnostic studies, examination and counseling of the patient on the above diagnosis, treatment plan and prognosis, orders of diagnostic tests, medications and/or appropriate procedures and documentation in the medical records of today's visit. verbalization

## 2025-01-28 RX ORDER — LOSARTAN POTASSIUM 25 MG/1
25 TABLET ORAL DAILY
Qty: 90 TABLET | Refills: 3 | Status: SHIPPED | OUTPATIENT
Start: 2025-01-28

## 2025-01-28 RX ORDER — METOPROLOL SUCCINATE 25 MG/1
TABLET, EXTENDED RELEASE ORAL
Qty: 180 TABLET | Refills: 3 | Status: SHIPPED | OUTPATIENT
Start: 2025-01-28

## 2025-01-28 RX ORDER — ATORVASTATIN CALCIUM 10 MG/1
10 TABLET, FILM COATED ORAL DAILY
Qty: 90 TABLET | Refills: 3 | Status: SHIPPED | OUTPATIENT
Start: 2025-01-28

## 2025-04-01 DIAGNOSIS — E04.1 THYROID NODULE: Primary | ICD-10-CM

## 2025-04-16 ENCOUNTER — OFFICE VISIT (OUTPATIENT)
Age: 60
End: 2025-04-16
Payer: COMMERCIAL

## 2025-04-16 VITALS
BODY MASS INDEX: 29.12 KG/M2 | OXYGEN SATURATION: 93 % | DIASTOLIC BLOOD PRESSURE: 93 MMHG | HEART RATE: 98 BPM | WEIGHT: 234.2 LBS | SYSTOLIC BLOOD PRESSURE: 151 MMHG | TEMPERATURE: 98.7 F | RESPIRATION RATE: 18 BRPM | HEIGHT: 75 IN

## 2025-04-16 DIAGNOSIS — E04.9 GOITER: Primary | ICD-10-CM

## 2025-04-16 PROCEDURE — 3077F SYST BP >= 140 MM HG: CPT | Performed by: SURGERY

## 2025-04-16 PROCEDURE — 3080F DIAST BP >= 90 MM HG: CPT | Performed by: SURGERY

## 2025-04-16 PROCEDURE — 99204 OFFICE O/P NEW MOD 45 MIN: CPT | Performed by: SURGERY

## 2025-04-16 ASSESSMENT — PATIENT HEALTH QUESTIONNAIRE - PHQ9
1. LITTLE INTEREST OR PLEASURE IN DOING THINGS: NOT AT ALL
2. FEELING DOWN, DEPRESSED OR HOPELESS: NOT AT ALL
SUM OF ALL RESPONSES TO PHQ QUESTIONS 1-9: 0

## 2025-04-16 ASSESSMENT — ENCOUNTER SYMPTOMS
BLOOD IN STOOL: 0
BACK PAIN: 0
WHEEZING: 0
NAUSEA: 0
STRIDOR: 0
EYE PAIN: 0
SORE THROAT: 0
DIARRHEA: 0
VOMITING: 0
SHORTNESS OF BREATH: 0
COUGH: 0
CONSTIPATION: 0
ABDOMINAL PAIN: 0

## 2025-04-16 NOTE — PROGRESS NOTES
Identified pt with two pt identifiers (name and ). Reviewed chart in preparation for visit and have obtained necessary documentation.    Kieran Chiu is a 60 y.o. male  Chief Complaint   Patient presents with    New Patient     Seen at the request of Dr. Cisneros, gato thyroid nodule.     Ht 1.892 m (6' 2.5\")   BMI 30.53 kg/m²     1. Have you been to the ER, urgent care clinic since your last visit?  Hospitalized since your last visit?no    2. Have you seen or consulted any other health care providers outside of the [facility] since your last visit?  Include any pap smears or colon screening. no

## 2025-04-16 NOTE — PROGRESS NOTES
Identified pt with two pt identifiers (name and ). Reviewed chart in preparation for visit and have obtained necessary documentation.    Kieran Chiu is a 60 y.o. male New Patient (Seen at the request of gato Eckert thyroid nodule.)  .    Vitals:    25 1533 25 1551   BP: (!) 159/97 (!) 151/93   BP Site: Right Upper Arm Right Upper Arm   Patient Position: Sitting Sitting   BP Cuff Size: Large Adult Large Adult   Pulse: 98    Resp: 18    Temp: 98.7 °F (37.1 °C)    TempSrc: Oral    SpO2: 93%    Weight: 106.2 kg (234 lb 3.2 oz)    Height: 1.892 m (6' 2.5\")           1. Have you been to the ER, urgent care clinic since your last visit?  Hospitalized since your last visit?  no     2. Have you seen or consulted any other health care providers outside of the [facility] since your last visit?  Include any pap smears or colon screening.  No  BP elevated. Patient advised to  follow up with PCP and check BP twice a day at home.

## 2025-04-16 NOTE — PROGRESS NOTES
Subjective:      Patient ID: Kieran Chiu is a 60 y.o. male who comes in for consultation for a thyroid nodule      Chief Complaint   Patient presents with    New Patient     Seen at the request of gato Eckert thyroid nodule.       HPI    He was noted to have neck swelling last fall on routine exam.   US suggested a large right thyroid nodule encompassing the entire lobe over 11 cm and enlarged left lobe as well.   He reports fullness and discomfort in the neck but not severe and he denies voice changes, or dysphagia, palpitations.    He does report some KERN.      Past Medical History:   Diagnosis Date    Cerebral aneurysm     s/p clippping    Hypertension      Past Surgical History:   Procedure Laterality Date    COLONOSCOPY N/A 2016    COLONOSCOPY performed by Jesus Manuel Larsen MD at [facility] ENDOSCOPY    OTHER SURGICAL HISTORY      cerebral anerysm clipping  and      Family History   Problem Relation Age of Onset    Diabetes Mother     Heart Disease Father         CHB pacemaker     Social History     Tobacco Use    Smoking status: Former     Current packs/day: 0.00     Types: Cigarettes     Quit date: 3/10/2016     Years since quittin.1    Smokeless tobacco: Never   Vaping Use    Vaping status: Former    Substances: Nicotine   Substance Use Topics    Alcohol use: Not Currently    Drug use: Yes     Types: OTC, Prescription     Current Outpatient Medications   Medication Sig    atorvastatin (LIPITOR) 10 MG tablet Take 1 tablet by mouth once daily    metoprolol succinate (TOPROL XL) 25 MG extended release tablet Take 2 tablets by mouth once daily    losartan (COZAAR) 25 MG tablet Take 1 tablet by mouth once daily    amLODIPine (NORVASC) 5 MG tablet Take 1 tablet by mouth once daily    naproxen (NAPROSYN) 500 MG tablet Take 1 tablet by mouth 2 times daily (with meals) prn    acetaminophen (TYLENOL) 500 MG tablet Take by mouth every 6 hours as needed     No current facility-administered medications for

## 2025-04-16 NOTE — H&P (VIEW-ONLY)
Subjective:      Patient ID: Kieran Chiu is a 60 y.o. male who comes in for consultation for a thyroid nodule      Chief Complaint   Patient presents with    New Patient     Seen at the request of gato Eckert thyroid nodule.       HPI    He was noted to have neck swelling last fall on routine exam.   US suggested a large right thyroid nodule encompassing the entire lobe over 11 cm and enlarged left lobe as well.   He reports fullness and discomfort in the neck but not severe and he denies voice changes, or dysphagia, palpitations.    He does report some KERN.      Past Medical History:   Diagnosis Date    Cerebral aneurysm     s/p clippping    Hypertension      Past Surgical History:   Procedure Laterality Date    COLONOSCOPY N/A 2016    COL[facility]OSCOPY performed by [facility]OSCOPY    OTHER SURGICAL HISTORY      cerebral anerysm clipping  and      Family History   Problem Relation Age of Onset    Diabetes Mother     Heart Disease Father         CHB pacemaker     Social History     Tobacco Use    Smoking status: Former     Current packs/day: 0.00     Types: Cigarettes     Quit date: 3/10/2016     Years since quittin.1    Smokeless tobacco: Never   Vaping Use    Vaping status: Former    Substances: Nicotine   Substance Use Topics    Alcohol use: Not Currently    Drug use: Yes     Types: OTC, Prescription     Current Outpatient Medications   Medication Sig    atorvastatin (LIPITOR) 10 MG tablet Take 1 tablet by mouth once daily    metoprolol succinate (TOPROL XL) 25 MG extended release tablet Take 2 tablets by mouth once daily    losartan (COZAAR) 25 MG tablet Take 1 tablet by mouth once daily    amLODIPine (NORVASC) 5 MG tablet Take 1 tablet by mouth once daily    naproxen (NAPROSYN) 500 MG tablet Take 1 tablet by mouth 2 times daily (with meals) prn    acetaminophen (TYLENOL) 500 MG tablet Take by mouth every 6 hours as needed     No current facility-administered medications for  tracheal deviation.     Cardiovascular:      Rate and Rhythm: Normal rate and regular rhythm.      Heart sounds: Normal heart sounds. No murmur heard.     No friction rub. No gallop.   Pulmonary:      Effort: Pulmonary effort is normal. No respiratory distress.      Breath sounds: Normal breath sounds. No stridor. No wheezing or rales.   Abdominal:      General: Bowel sounds are normal. There is no distension.      Palpations: There is no mass.      Tenderness: There is no abdominal tenderness. There is no guarding or rebound.      Hernia: No hernia is present.   Musculoskeletal:         General: No swelling or tenderness. Normal range of motion.      Cervical back: Full passive range of motion without pain, normal range of motion and neck supple.   Lymphadenopathy:      Cervical: No cervical adenopathy.      Upper Body:      Right upper body: No supraclavicular adenopathy.      Left upper body: No supraclavicular adenopathy.   Skin:     Coloration: Skin is not jaundiced.      Findings: No erythema or rash.   Neurological:      Mental Status: He is alert and oriented to person, place, and time.      Cranial Nerves: No cranial nerve deficit.      Coordination: Coordination normal.      Gait: Gait normal.   Psychiatric:         Behavior: Behavior normal.         Thought Content: Thought content normal.         Judgment: Judgment normal.         US 9/4/2024  I reviewed his neck US images today  FINDINGS:      RIGHT LOBE: measures 52 x 73 x 102 mm. Completely replaced by an isoechoic,  centrally cystic, oval, circumscribed nodule of the same size.  LEFT LOBE: measures 18 x 17 x 51 mm. No nodule.  ISTHMUS: No nodule.  PARENCHYMA: The gland is homogenous in echotexture.         IMPRESSION:  Very large right lobe thyroid nodule.    Assessment / Plan:       Very large goiter, euthyroid.  I explained about the anatomy and pathophysiology of thyroid nodules/disease.  I explained about possible malignancy.  I discussed FNA,

## 2025-04-17 ENCOUNTER — PREP FOR PROCEDURE (OUTPATIENT)
Age: 60
End: 2025-04-17

## 2025-05-01 ENCOUNTER — TELEPHONE (OUTPATIENT)
Age: 60
End: 2025-05-01

## 2025-05-01 ENCOUNTER — HOSPITAL ENCOUNTER (OUTPATIENT)
Facility: HOSPITAL | Age: 60
Discharge: HOME | End: 2025-05-01
Payer: COMMERCIAL

## 2025-05-01 VITALS
OXYGEN SATURATION: 96 % | WEIGHT: 232.81 LBS | SYSTOLIC BLOOD PRESSURE: 132 MMHG | TEMPERATURE: 99.4 F | BODY MASS INDEX: 29.88 KG/M2 | HEIGHT: 74 IN | RESPIRATION RATE: 20 BRPM | HEART RATE: 88 BPM | DIASTOLIC BLOOD PRESSURE: 72 MMHG

## 2025-05-01 LAB
ERYTHROCYTE [DISTWIDTH] IN BLOOD BY AUTOMATED COUNT: 12.6 % (ref 11.5–14.5)
HCT VFR BLD AUTO: 45.3 % (ref 36.6–50.3)
HGB BLD-MCNC: 15.4 G/DL (ref 12.1–17)
MCH RBC QN AUTO: 32.2 PG (ref 26–34)
MCHC RBC AUTO-ENTMCNC: 34 G/DL (ref 30–36.5)
MCV RBC AUTO: 94.8 FL (ref 80–99)
NRBC # BLD: 0 K/UL (ref 0–0.01)
NRBC BLD-RTO: 0 PER 100 WBC
PLATELET # BLD AUTO: 291 K/UL (ref 150–400)
PMV BLD AUTO: 9.6 FL (ref 8.9–12.9)
RBC # BLD AUTO: 4.78 M/UL (ref 4.1–5.7)
WBC # BLD AUTO: 10.6 K/UL (ref 4.1–11.1)

## 2025-05-01 PROCEDURE — 85027 COMPLETE CBC AUTOMATED: CPT

## 2025-05-01 PROCEDURE — 36415 COLL VENOUS BLD VENIPUNCTURE: CPT

## 2025-05-01 RX ORDER — SODIUM CHLORIDE, SODIUM LACTATE, POTASSIUM CHLORIDE, CALCIUM CHLORIDE 600; 310; 30; 20 MG/100ML; MG/100ML; MG/100ML; MG/100ML
INJECTION, SOLUTION INTRAVENOUS CONTINUOUS
OUTPATIENT
Start: 2025-05-08

## 2025-05-01 RX ORDER — CEFAZOLIN SODIUM/WATER 2 G/20 ML
2000 SYRINGE (ML) INTRAVENOUS ONCE
OUTPATIENT
Start: 2025-05-08

## 2025-05-01 NOTE — PERIOP NOTE

## 2025-05-01 NOTE — PERIOP NOTE
[facility]  Preoperative Instructions        Surgery Date Thursday, May 8th          Time of Arrival TBD/TBC @ [phone]    On the day of your surgery, please report to Surgical Services Registration Desk and sign in at your designated time.  The Surgery Center is located to the right of the Emergency Room.     2. You must have someone with you to drive you home. You should not drive a car for 24 hours following surgery. Please make arrangements for a friend or family member to stay with you for the first 24 hours after your surgery.    3. Do not have anything to eat or drink (including water, gum, mints, coffee, juice) after midnight the night prior to surgery.?This may not apply to medications prescribed by your physician. ?(Please note below the special instructions with medications to take the morning of your procedure.)    4. We recommend you do not drink any alcoholic beverages for 24 hours before and after your surgery.    5. Contact your surgeon's office for instructions on the following medications: non-steroidal anti-inflammatory drugs (i.e. Advil, Aleve, ibuprofen, motrin, Naproxen), vitamins, and supplements. (Some surgeon's will want you to stop these medications prior to surgery and others may allow you to take them)  **If you are currently taking Plavix, Coumadin, Aspirin and/or other blood-thinning agents, contact your surgeon for instructions.** Your surgeon will partner with the physician prescribing these medications to determine if it is safe to stop or if you need to continue taking.  Please do not stop taking these medications without instructions from your surgeon    6. Wear comfortable clothes.  Wear glasses instead of contacts.  Do not bring any money or jewelry. Please bring picture ID, insurance card, and any prearranged co-payment or hospital payment.  Do not wear make-up, particularly mascara the morning of your surgery.  Do not wear nail polish, particularly if

## 2025-05-01 NOTE — PERIOP NOTE

## 2025-05-01 NOTE — TELEPHONE ENCOUNTER
Blaise from [facility] pt is allergic to penicillin wants to know if md wants to continue with ances      [phone]

## 2025-05-07 ENCOUNTER — ANESTHESIA EVENT (OUTPATIENT)
Facility: HOSPITAL | Age: 60
End: 2025-05-07
Payer: COMMERCIAL

## 2025-05-07 ASSESSMENT — LIFESTYLE VARIABLES: SMOKING_STATUS: 1

## 2025-05-07 NOTE — ANESTHESIA PRE PROCEDURE
Department of Anesthesiology  Preprocedure Note       Name:  Kieran Chiu   Age:  60 y.o.  :  1965                                          MRN:  [id number]         Date:  2025      Surgeon: Surgeon(s):  Akash Treadwell MD    Procedure: Procedure(s):  TOTAL THYROIDECTOMY    Medications prior to admission:   Prior to Admission medications    Medication Sig Start Date End Date Taking? Authorizing Provider   atorvastatin (LIPITOR) 10 MG tablet Take 1 tablet by mouth once daily 25   Dwayne Cisneros MD   metoprolol succinate (TOPROL XL) 25 MG extended release tablet Take 2 tablets by mouth once daily 25   Dwayne Cisneros MD   losartan (COZAAR) 25 MG tablet Take 1 tablet by mouth once daily 25   Dwayne Cisneros MD   amLODIPine (NORVASC) 5 MG tablet Take 1 tablet by mouth once daily 24   Dwayne Cisneros MD   naproxen (NAPROSYN) 500 MG tablet Take 1 tablet by mouth 2 times daily (with meals) prn 24   Dwayne Cisneros MD   acetaminophen (TYLENOL) 500 MG tablet Take by mouth every 6 hours as needed    Automatic Reconciliation, Ar       Current medications:    No current facility-administered medications for this encounter.     Current Outpatient Medications   Medication Sig Dispense Refill   • atorvastatin (LIPITOR) 10 MG tablet Take 1 tablet by mouth once daily 90 tablet 3   • metoprolol succinate (TOPROL XL) 25 MG extended release tablet Take 2 tablets by mouth once daily 180 tablet 3   • losartan (COZAAR) 25 MG tablet Take 1 tablet by mouth once daily 90 tablet 3   • amLODIPine (NORVASC) 5 MG tablet Take 1 tablet by mouth once daily 90 tablet 3   • naproxen (NAPROSYN) 500 MG tablet Take 1 tablet by mouth 2 times daily (with meals) prn 60 tablet 0   • acetaminophen (TYLENOL) 500 MG tablet Take by mouth every 6 hours as needed         Allergies:    Allergies   Allergen Reactions   • Iodinated Contrast Media Other (See Comments)     \"Sneezing\"   • Bee Venom Swelling   • Penicillins Other (See

## 2025-05-08 ENCOUNTER — HOSPITAL ENCOUNTER (OUTPATIENT)
Facility: HOSPITAL | Age: 60
Discharge: HOME | End: 2025-05-09
Attending: SURGERY | Admitting: SURGERY
Payer: COMMERCIAL

## 2025-05-08 ENCOUNTER — ANESTHESIA (OUTPATIENT)
Facility: HOSPITAL | Age: 60
End: 2025-05-08
Payer: COMMERCIAL

## 2025-05-08 DIAGNOSIS — E04.2 MULTINODULAR GOITER: Primary | ICD-10-CM

## 2025-05-08 LAB
CALCIUM SERPL-MCNC: 9 MG/DL (ref 8.5–10.1)
CALCIUM SERPL-MCNC: 9.3 MG/DL (ref 8.5–10.1)

## 2025-05-08 PROCEDURE — 3600000014 HC SURGERY LEVEL 4 ADDTL 15MIN: Performed by: SURGERY

## 2025-05-08 PROCEDURE — 6360000002 HC RX W HCPCS: Performed by: SURGERY

## 2025-05-08 PROCEDURE — 2709999900 HC NON-CHARGEABLE SUPPLY: Performed by: SURGERY

## 2025-05-08 PROCEDURE — 3700000000 HC ANESTHESIA ATTENDED CARE: Performed by: SURGERY

## 2025-05-08 PROCEDURE — 88307 TISSUE EXAM BY PATHOLOGIST: CPT

## 2025-05-08 PROCEDURE — 82310 ASSAY OF CALCIUM: CPT

## 2025-05-08 PROCEDURE — 6360000002 HC RX W HCPCS: Performed by: ANESTHESIOLOGY

## 2025-05-08 PROCEDURE — 36415 COLL VENOUS BLD VENIPUNCTURE: CPT

## 2025-05-08 PROCEDURE — 2580000003 HC RX 258: Performed by: ANESTHESIOLOGY

## 2025-05-08 PROCEDURE — 3700000001 HC ADD 15 MINUTES (ANESTHESIA): Performed by: SURGERY

## 2025-05-08 PROCEDURE — 2500000003 HC RX 250 WO HCPCS: Performed by: SURGERY

## 2025-05-08 PROCEDURE — 2720000010 HC SURG SUPPLY STERILE: Performed by: SURGERY

## 2025-05-08 PROCEDURE — 7100000001 HC PACU RECOVERY - ADDTL 15 MIN: Performed by: SURGERY

## 2025-05-08 PROCEDURE — 2500000003 HC RX 250 WO HCPCS: Performed by: ANESTHESIOLOGY

## 2025-05-08 PROCEDURE — 7100000000 HC PACU RECOVERY - FIRST 15 MIN: Performed by: SURGERY

## 2025-05-08 PROCEDURE — 3600000004 HC SURGERY LEVEL 4 BASE: Performed by: SURGERY

## 2025-05-08 PROCEDURE — 60240 REMOVAL OF THYROID: CPT | Performed by: SURGERY

## 2025-05-08 PROCEDURE — 6370000000 HC RX 637 (ALT 250 FOR IP): Performed by: SURGERY

## 2025-05-08 DEVICE — GRAFT HUM TISS W2XL12CM WHL MEM AMNIO SHT NO ANG AMINOFIX: Type: IMPLANTABLE DEVICE | Site: THYROID | Status: FUNCTIONAL

## 2025-05-08 RX ORDER — SODIUM CHLORIDE 0.9 % (FLUSH) 0.9 %
5-40 SYRINGE (ML) INJECTION EVERY 12 HOURS SCHEDULED
Status: DISCONTINUED | OUTPATIENT
Start: 2025-05-08 | End: 2025-05-09 | Stop reason: HOSPADM

## 2025-05-08 RX ORDER — METOPROLOL SUCCINATE 50 MG/1
50 TABLET, EXTENDED RELEASE ORAL DAILY
Status: DISCONTINUED | OUTPATIENT
Start: 2025-05-08 | End: 2025-05-09 | Stop reason: HOSPADM

## 2025-05-08 RX ORDER — HYDROMORPHONE HYDROCHLORIDE 1 MG/ML
0.25 INJECTION, SOLUTION INTRAMUSCULAR; INTRAVENOUS; SUBCUTANEOUS
Refills: 0 | Status: DISCONTINUED | OUTPATIENT
Start: 2025-05-08 | End: 2025-05-09 | Stop reason: HOSPADM

## 2025-05-08 RX ORDER — PROCHLORPERAZINE EDISYLATE 5 MG/ML
5 INJECTION INTRAMUSCULAR; INTRAVENOUS
Status: DISCONTINUED | OUTPATIENT
Start: 2025-05-08 | End: 2025-05-08 | Stop reason: HOSPADM

## 2025-05-08 RX ORDER — ONDANSETRON 4 MG/1
4 TABLET, ORALLY DISINTEGRATING ORAL EVERY 8 HOURS PRN
Status: DISCONTINUED | OUTPATIENT
Start: 2025-05-08 | End: 2025-05-09 | Stop reason: HOSPADM

## 2025-05-08 RX ORDER — OXYCODONE HYDROCHLORIDE 5 MG/1
5 TABLET ORAL EVERY 6 HOURS PRN
Qty: 12 TABLET | Refills: 0 | Status: SHIPPED | OUTPATIENT
Start: 2025-05-08 | End: 2025-05-11

## 2025-05-08 RX ORDER — NALOXONE HYDROCHLORIDE 0.4 MG/ML
INJECTION, SOLUTION INTRAMUSCULAR; INTRAVENOUS; SUBCUTANEOUS PRN
Status: DISCONTINUED | OUTPATIENT
Start: 2025-05-08 | End: 2025-05-08 | Stop reason: HOSPADM

## 2025-05-08 RX ORDER — SODIUM CHLORIDE 0.9 % (FLUSH) 0.9 %
5-40 SYRINGE (ML) INJECTION PRN
Status: DISCONTINUED | OUTPATIENT
Start: 2025-05-08 | End: 2025-05-09 | Stop reason: HOSPADM

## 2025-05-08 RX ORDER — OXYCODONE HYDROCHLORIDE 5 MG/1
5 TABLET ORAL EVERY 4 HOURS PRN
Refills: 0 | Status: DISCONTINUED | OUTPATIENT
Start: 2025-05-08 | End: 2025-05-09 | Stop reason: HOSPADM

## 2025-05-08 RX ORDER — SODIUM CHLORIDE 0.9 % (FLUSH) 0.9 %
5-40 SYRINGE (ML) INJECTION PRN
Status: DISCONTINUED | OUTPATIENT
Start: 2025-05-08 | End: 2025-05-08 | Stop reason: HOSPADM

## 2025-05-08 RX ORDER — DEXAMETHASONE SODIUM PHOSPHATE 4 MG/ML
4 INJECTION, SOLUTION INTRA-ARTICULAR; INTRALESIONAL; INTRAMUSCULAR; INTRAVENOUS; SOFT TISSUE ONCE
Status: COMPLETED | OUTPATIENT
Start: 2025-05-08 | End: 2025-05-08

## 2025-05-08 RX ORDER — SODIUM CHLORIDE 9 MG/ML
INJECTION, SOLUTION INTRAVENOUS PRN
Status: DISCONTINUED | OUTPATIENT
Start: 2025-05-08 | End: 2025-05-09 | Stop reason: HOSPADM

## 2025-05-08 RX ORDER — HYDROMORPHONE HYDROCHLORIDE 1 MG/ML
0.25 INJECTION, SOLUTION INTRAMUSCULAR; INTRAVENOUS; SUBCUTANEOUS EVERY 5 MIN PRN
Status: COMPLETED | OUTPATIENT
Start: 2025-05-08 | End: 2025-05-08

## 2025-05-08 RX ORDER — PROPOFOL 10 MG/ML
INJECTION, EMULSION INTRAVENOUS
Status: DISCONTINUED | OUTPATIENT
Start: 2025-05-08 | End: 2025-05-08 | Stop reason: SDUPTHER

## 2025-05-08 RX ORDER — LIDOCAINE HYDROCHLORIDE 20 MG/ML
INJECTION, SOLUTION EPIDURAL; INFILTRATION; INTRACAUDAL; PERINEURAL
Status: DISCONTINUED | OUTPATIENT
Start: 2025-05-08 | End: 2025-05-08 | Stop reason: SDUPTHER

## 2025-05-08 RX ORDER — SODIUM CHLORIDE 9 MG/ML
INJECTION, SOLUTION INTRAVENOUS PRN
Status: DISCONTINUED | OUTPATIENT
Start: 2025-05-08 | End: 2025-05-08 | Stop reason: HOSPADM

## 2025-05-08 RX ORDER — FENTANYL CITRATE 50 UG/ML
INJECTION, SOLUTION INTRAMUSCULAR; INTRAVENOUS
Status: DISCONTINUED | OUTPATIENT
Start: 2025-05-08 | End: 2025-05-08 | Stop reason: SDUPTHER

## 2025-05-08 RX ORDER — DEXAMETHASONE SODIUM PHOSPHATE 4 MG/ML
INJECTION, SOLUTION INTRA-ARTICULAR; INTRALESIONAL; INTRAMUSCULAR; INTRAVENOUS; SOFT TISSUE
Status: DISCONTINUED | OUTPATIENT
Start: 2025-05-08 | End: 2025-05-08 | Stop reason: SDUPTHER

## 2025-05-08 RX ORDER — SODIUM CHLORIDE 0.9 % (FLUSH) 0.9 %
5-40 SYRINGE (ML) INJECTION EVERY 12 HOURS SCHEDULED
Status: DISCONTINUED | OUTPATIENT
Start: 2025-05-08 | End: 2025-05-08 | Stop reason: HOSPADM

## 2025-05-08 RX ORDER — SUCCINYLCHOLINE/SOD CL,ISO/PF 200MG/10ML
SYRINGE (ML) INTRAVENOUS
Status: DISCONTINUED | OUTPATIENT
Start: 2025-05-08 | End: 2025-05-08 | Stop reason: SDUPTHER

## 2025-05-08 RX ORDER — ONDANSETRON 2 MG/ML
INJECTION INTRAMUSCULAR; INTRAVENOUS
Status: DISCONTINUED | OUTPATIENT
Start: 2025-05-08 | End: 2025-05-08 | Stop reason: SDUPTHER

## 2025-05-08 RX ORDER — DEXTROSE MONOHYDRATE, SODIUM CHLORIDE, AND POTASSIUM CHLORIDE 50; 1.49; 4.5 G/1000ML; G/1000ML; G/1000ML
INJECTION, SOLUTION INTRAVENOUS CONTINUOUS
Status: DISCONTINUED | OUTPATIENT
Start: 2025-05-08 | End: 2025-05-09 | Stop reason: HOSPADM

## 2025-05-08 RX ORDER — ATORVASTATIN CALCIUM 10 MG/1
10 TABLET, FILM COATED ORAL DAILY
Status: DISCONTINUED | OUTPATIENT
Start: 2025-05-08 | End: 2025-05-09 | Stop reason: HOSPADM

## 2025-05-08 RX ORDER — LEVOTHYROXINE SODIUM 150 UG/1
150 TABLET ORAL DAILY
Qty: 30 TABLET | Refills: 0 | Status: SHIPPED | OUTPATIENT
Start: 2025-05-08

## 2025-05-08 RX ORDER — PHENYLEPHRINE HCL IN 0.9% NACL 0.4MG/10ML
SYRINGE (ML) INTRAVENOUS
Status: DISCONTINUED | OUTPATIENT
Start: 2025-05-08 | End: 2025-05-08 | Stop reason: SDUPTHER

## 2025-05-08 RX ORDER — LOSARTAN POTASSIUM 25 MG/1
25 TABLET ORAL DAILY
Status: DISCONTINUED | OUTPATIENT
Start: 2025-05-08 | End: 2025-05-09 | Stop reason: HOSPADM

## 2025-05-08 RX ORDER — HYDROMORPHONE HYDROCHLORIDE 1 MG/ML
0.5 INJECTION, SOLUTION INTRAMUSCULAR; INTRAVENOUS; SUBCUTANEOUS
Refills: 0 | Status: DISCONTINUED | OUTPATIENT
Start: 2025-05-08 | End: 2025-05-09 | Stop reason: HOSPADM

## 2025-05-08 RX ORDER — AMLODIPINE BESYLATE 5 MG/1
5 TABLET ORAL DAILY
Status: DISCONTINUED | OUTPATIENT
Start: 2025-05-08 | End: 2025-05-09 | Stop reason: HOSPADM

## 2025-05-08 RX ORDER — EPHEDRINE SULFATE/0.9% NACL/PF 50 MG/5 ML
SYRINGE (ML) INTRAVENOUS
Status: DISCONTINUED | OUTPATIENT
Start: 2025-05-08 | End: 2025-05-08 | Stop reason: SDUPTHER

## 2025-05-08 RX ORDER — FENTANYL CITRATE 50 UG/ML
50 INJECTION, SOLUTION INTRAMUSCULAR; INTRAVENOUS EVERY 5 MIN PRN
Status: DISCONTINUED | OUTPATIENT
Start: 2025-05-08 | End: 2025-05-08 | Stop reason: HOSPADM

## 2025-05-08 RX ORDER — METHYLPREDNISOLONE 4 MG/1
TABLET ORAL
Qty: 1 KIT | Refills: 0 | Status: SHIPPED | OUTPATIENT
Start: 2025-05-08

## 2025-05-08 RX ORDER — ROCURONIUM BROMIDE 10 MG/ML
INJECTION, SOLUTION INTRAVENOUS
Status: DISCONTINUED | OUTPATIENT
Start: 2025-05-08 | End: 2025-05-08 | Stop reason: SDUPTHER

## 2025-05-08 RX ORDER — ONDANSETRON 2 MG/ML
4 INJECTION INTRAMUSCULAR; INTRAVENOUS EVERY 6 HOURS PRN
Status: DISCONTINUED | OUTPATIENT
Start: 2025-05-08 | End: 2025-05-09 | Stop reason: HOSPADM

## 2025-05-08 RX ORDER — SODIUM CHLORIDE, SODIUM LACTATE, POTASSIUM CHLORIDE, CALCIUM CHLORIDE 600; 310; 30; 20 MG/100ML; MG/100ML; MG/100ML; MG/100ML
INJECTION, SOLUTION INTRAVENOUS CONTINUOUS
Status: DISCONTINUED | OUTPATIENT
Start: 2025-05-08 | End: 2025-05-08 | Stop reason: HOSPADM

## 2025-05-08 RX ADMIN — ATORVASTATIN CALCIUM 10 MG: 10 TABLET, FILM COATED ORAL at 15:41

## 2025-05-08 RX ADMIN — WATER 2000 MG: 1 INJECTION INTRAMUSCULAR; INTRAVENOUS; SUBCUTANEOUS at 07:34

## 2025-05-08 RX ADMIN — Medication 70 MG: at 07:31

## 2025-05-08 RX ADMIN — DEXAMETHASONE SODIUM PHOSPHATE 4 MG: 4 INJECTION INTRA-ARTICULAR; INTRALESIONAL; INTRAMUSCULAR; INTRAVENOUS; SOFT TISSUE at 15:52

## 2025-05-08 RX ADMIN — FENTANYL CITRATE 50 MCG: 50 INJECTION INTRAMUSCULAR; INTRAVENOUS at 07:58

## 2025-05-08 RX ADMIN — Medication 40 MCG: at 08:18

## 2025-05-08 RX ADMIN — PROPOFOL 150 MG: 10 INJECTION, EMULSION INTRAVENOUS at 07:31

## 2025-05-08 RX ADMIN — HYDROMORPHONE HYDROCHLORIDE 0.25 MG: 1 INJECTION, SOLUTION INTRAMUSCULAR; INTRAVENOUS; SUBCUTANEOUS at 10:25

## 2025-05-08 RX ADMIN — DEXTROSE, SODIUM CHLORIDE, AND POTASSIUM CHLORIDE: 5; .45; .15 INJECTION INTRAVENOUS at 15:39

## 2025-05-08 RX ADMIN — Medication 40 MCG: at 07:47

## 2025-05-08 RX ADMIN — Medication 80 MCG: at 07:52

## 2025-05-08 RX ADMIN — Medication 40 MCG: at 08:14

## 2025-05-08 RX ADMIN — FENTANYL CITRATE 50 MCG: 50 INJECTION INTRAMUSCULAR; INTRAVENOUS at 08:54

## 2025-05-08 RX ADMIN — ROCURONIUM BROMIDE 10 MG: 10 INJECTION INTRAVENOUS at 07:31

## 2025-05-08 RX ADMIN — Medication 5 MG: at 08:18

## 2025-05-08 RX ADMIN — METOPROLOL SUCCINATE 50 MG: 50 TABLET, EXTENDED RELEASE ORAL at 15:41

## 2025-05-08 RX ADMIN — LIDOCAINE HYDROCHLORIDE 150 MG: 20 INJECTION, SOLUTION EPIDURAL; INFILTRATION; INTRACAUDAL; PERINEURAL at 07:31

## 2025-05-08 RX ADMIN — ONDANSETRON HYDROCHLORIDE 4 MG: 2 INJECTION, SOLUTION INTRAMUSCULAR; INTRAVENOUS at 08:32

## 2025-05-08 RX ADMIN — PROPOFOL 50 MG: 10 INJECTION, EMULSION INTRAVENOUS at 07:58

## 2025-05-08 RX ADMIN — DEXAMETHASONE SODIUM PHOSPHATE 4 MG: 4 INJECTION, SOLUTION INTRAMUSCULAR; INTRAVENOUS at 08:32

## 2025-05-08 RX ADMIN — HYDROMORPHONE HYDROCHLORIDE 0.25 MG: 1 INJECTION, SOLUTION INTRAMUSCULAR; INTRAVENOUS; SUBCUTANEOUS at 10:30

## 2025-05-08 RX ADMIN — Medication 80 MCG: at 08:12

## 2025-05-08 RX ADMIN — SODIUM CHLORIDE, POTASSIUM CHLORIDE, SODIUM LACTATE AND CALCIUM CHLORIDE: 600; 310; 30; 20 INJECTION, SOLUTION INTRAVENOUS at 08:55

## 2025-05-08 RX ADMIN — FENTANYL CITRATE 150 MCG: 50 INJECTION INTRAMUSCULAR; INTRAVENOUS at 07:31

## 2025-05-08 RX ADMIN — LOSARTAN POTASSIUM 25 MG: 25 TABLET, FILM COATED ORAL at 15:41

## 2025-05-08 RX ADMIN — Medication 5 MG: at 08:49

## 2025-05-08 RX ADMIN — SODIUM CHLORIDE, POTASSIUM CHLORIDE, SODIUM LACTATE AND CALCIUM CHLORIDE: 600; 310; 30; 20 INJECTION, SOLUTION INTRAVENOUS at 07:29

## 2025-05-08 RX ADMIN — FENTANYL CITRATE 50 MCG: 50 INJECTION INTRAMUSCULAR; INTRAVENOUS at 07:40

## 2025-05-08 RX ADMIN — FENTANYL CITRATE 50 MCG: 50 INJECTION INTRAMUSCULAR; INTRAVENOUS at 08:59

## 2025-05-08 RX ADMIN — AMLODIPINE BESYLATE 5 MG: 5 TABLET ORAL at 15:41

## 2025-05-08 RX ADMIN — SODIUM CHLORIDE, PRESERVATIVE FREE 10 ML: 5 INJECTION INTRAVENOUS at 15:41

## 2025-05-08 ASSESSMENT — PAIN DESCRIPTION - ORIENTATION
ORIENTATION: ANTERIOR
ORIENTATION: ANTERIOR

## 2025-05-08 ASSESSMENT — PAIN DESCRIPTION - PAIN TYPE
TYPE: SURGICAL PAIN;ACUTE PAIN
TYPE: ACUTE PAIN

## 2025-05-08 ASSESSMENT — PAIN DESCRIPTION - LOCATION
LOCATION: HEAD
LOCATION: NECK;HEAD

## 2025-05-08 ASSESSMENT — PAIN DESCRIPTION - DESCRIPTORS
DESCRIPTORS: THROBBING
DESCRIPTORS: ACHING;DISCOMFORT;DULL

## 2025-05-08 ASSESSMENT — PAIN SCALES - GENERAL
PAINLEVEL_OUTOF10: 1
PAINLEVEL_OUTOF10: 1

## 2025-05-08 ASSESSMENT — PAIN DESCRIPTION - ONSET
ONSET: PROGRESSIVE
ONSET: PROGRESSIVE

## 2025-05-08 ASSESSMENT — PAIN DESCRIPTION - FREQUENCY
FREQUENCY: INTERMITTENT
FREQUENCY: INTERMITTENT

## 2025-05-08 NOTE — INTERVAL H&P NOTE
Update History & Physical    The patient's History and Physical of April 16, 2025 was reviewed with the patient and I examined the patient. There was no change. The surgical site was confirmed by the patient and me.     Plan: The risks, benefits, expected outcome, and alternative to the recommended procedure have been discussed with the patient. Patient understands and wants to proceed with the procedure.     Electronically signed by Akash Treadwell MD on 5/8/2025 at 6:53 AM

## 2025-05-08 NOTE — OP NOTE
[facility]              [address] [place]  [address]                            OPERATIVE REPORT      PATIENT NAME: LITTLE HERNANDEZ                : 1965  MED REC NO: [id number]                       ROOM: 3115  ACCOUNT NO: [id number]                       ADMIT DATE: 2025  PROVIDER: Akash Koo MD    DATE OF SERVICE:  2025    PREOPERATIVE DIAGNOSES:  Very large multinodular goiter, right more than left.    POSTOPERATIVE DIAGNOSES:  Very large multinodular goiter, right more than left.    PROCEDURES PERFORMED:  Total thyroidectomy.    SURGEON:  Akash Koo MD    ASSISTANT:  Gerald Liddle.    ANESTHESIA:  General.    ESTIMATED BLOOD LOSS:  Minimal.    SPECIMENS REMOVED:  Thyroid, right upper pole.    INTRAOPERATIVE FINDINGS:  No infection at the time of surgery.     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  AmnioFix.    INDICATIONS:  The patient is a 60-year-old with a very large right-sided right dominant goiter but also left-sided.  This was enlarged.  Options were discussed, elected to undergo thyroidectomy.  He understood the risks and benefits including bleeding, infection, recurrent laryngeal nerve injury, parathyroid injury, and recent temporary or permanent hypercalcemia, bleeding risk, and need for reoperation.    DESCRIPTION OF PROCEDURE:  The patient was taken to the operating room and placed on the operating room table in a supine position and underwent general anesthesia with a neural integrity monitor tube.  A roll was placed at the shoulder.  Neck was placed in mild hyperextension and the neck prepped and draped in the usual sterile fashion after placement of leads.  A small incision was made in the lower neck, 6 cm incision and electrocautery was used to go through the subcu.  After appropriate time-out, the

## 2025-05-08 NOTE — BRIEF OP NOTE
Brief Postoperative Note      Patient: Kieran Chiu  YOB: 1965  MRN: [id number]    Date of Procedure: 5/8/2025    Pre-Op Diagnosis Codes:      * Goiter [E04.9]    Post-Op Diagnosis: Post-Op Diagnosis Codes:     * Goiter [E04.9]       Procedure(s):  TOTAL THYROIDECTOMY    Surgeon(s):  Akash Treadwell MD    Assistant:  First Assistant: Liddle, Gerald, RN    Anesthesia: General    Estimated Blood Loss (mL): Minimal    Complications: None    Specimens:   ID Type Source Tests Collected by Time Destination   1 : thyroid stitch right upper pole Tissue Thyroid SURGICAL PATHOLOGY Akash Treadwell MD 5/8/2025 0803        Implants:  Implant Name Type Inv. Item Serial No.  Lot No. LRB No. Used Action   GRAFT HUM TISS [id number] WHL MEM AMNIO SHT NO ANG AMINOFIX - [id number]  GRAFT HUM TISS [id number] WHL MEM AMNIO SHT NO ANG AMINOFIX [id number] [facility]- NA N/A 1 Implanted         Drains: * No LDAs found *    Findings:  Infection Present At Time Of Surgery (PATOS) (choose all levels that have infection present):  No infection present  Other Findings: extremely large right lobe, right recurrent laryngeal nerve partially transected and repaired    Electronically signed by Akash Treadwell MD on 5/8/2025 at 9:46 AM

## 2025-05-08 NOTE — ANESTHESIA POSTPROCEDURE EVALUATION
Department of Anesthesiology  Postprocedure Note    Patient: Kieran Chiu  MRN: [id number]  YOB: 1965  Date of evaluation: 5/8/2025    Procedure Summary       Date: 05/08/25 Room / Location: [facility] MA[facility]  / [facility] MAIN OR    Anesthesia Start: 0729 Anesthesia Stop: 0950    Procedure: TOTAL THYROIDECTOMY (Neck) Diagnosis:       Goiter      (Goiter [E04.9])    Providers: Akash Treadwell MD Responsible Provider: Iban Manley MD    Anesthesia Type: general ASA Status: 3            Anesthesia Type: No value filed.    Sen Phase I:      Sen Phase II:      Anesthesia Post Evaluation    Patient location during evaluation: PACU  Patient participation: complete - patient participated  Level of consciousness: sleepy but conscious and responsive to verbal stimuli  Pain score: 3  Airway patency: patent  Nausea & Vomiting: no vomiting and no nausea  Cardiovascular status: blood pressure returned to baseline and hemodynamically stable  Respiratory status: acceptable  Hydration status: stable  Multimodal analgesia pain management approach  Pain management: adequate    No notable events documented.

## 2025-05-08 NOTE — PROGRESS NOTES
Serial calcium drawn and sent. Education provided to pt on importance of lab draws s/p thyroidectomy and what s/s of hypocalcemia to report. Pt verbalized understanding.

## 2025-05-08 NOTE — ANESTHESIA PRE PROCEDURE
Department of Anesthesiology  Preprocedure Note       Name:  Kieran Chiu   Age:  60 y.o.  :  1965                                          MRN:  [id number]         Date:  2025      Surgeon: Surgeon(s):  Akash Treadwell MD    Procedure: Procedure(s):  TOTAL THYROIDECTOMY    Medications prior to admission:   Prior to Admission medications    Medication Sig Start Date End Date Taking? Authorizing Provider   levothyroxine (SYNTHROID) 150 MCG tablet Take 1 tablet by mouth daily 25  Yes Akash Treadwell MD   oxyCODONE (ROXICODONE) 5 MG immediate release tablet Take 1 tablet by mouth every 6 hours as needed for Pain for up to 3 days. Intended supply: 3 days. Take lowest dose possible to manage pain Max Daily Amount: 20 mg 25 Yes Akash Treadwell MD   methylPREDNISolone (MEDROL DOSEPACK) 4 MG tablet Take by mouth. 25  Yes Akash Treadwell MD   atorvastatin (LIPITOR) 10 MG tablet Take 1 tablet by mouth once daily 25  Yes Dwayne Cisneros MD   metoprolol succinate (TOPROL XL) 25 MG extended release tablet Take 2 tablets by mouth once daily 25  Yes Dwayne Cisneros MD   losartan (COZAAR) 25 MG tablet Take 1 tablet by mouth once daily 25  Yes Dwayne Cisneros MD   amLODIPine (NORVASC) 5 MG tablet Take 1 tablet by mouth once daily 24  Yes Dwayne Cisneros MD   acetaminophen (TYLENOL) 500 MG tablet Take by mouth every 6 hours as needed   Yes Automatic Reconciliation, Ar   naproxen (NAPROSYN) 500 MG tablet Take 1 tablet by mouth 2 times daily (with meals) prn 24   Dwayne Cisneros MD       Current medications:    Current Facility-Administered Medications   Medication Dose Route Frequency Provider Last Rate Last Admin   • naloxone 0.4 mg in 10 mL sodium chloride syringe   IntraVENous PRN Camron Bar MD       • sodium chloride flush 0.9 % injection 5-40 mL  5-40 mL IntraVENous 2 times per day Camron Bar MD       • sodium chloride flush 0.9 %

## 2025-05-08 NOTE — DISCHARGE INSTRUCTIONS
Discharge Instructions:  Thyroidectomy    Dr. Treadwell    Call for appointment for follow up in 1 week [phone]    Activity:    Walk regularly.  You may resume driving in 24 hours unless still requiring narcotics for pain.      Work:    You may return to work in 5 - 7 days to light activity. No lifting more than 10 pounds for one week.    Diet:    You may resume normal diet after 24 hours.  Anesthesia and narcotics may cause nausea and vomiting.  If persistent please call the office.    Call if you have numbness or tingling around lips or fingertips as this is a sign of low calcium.    Wound Care:    You have a special dressing called Dermabond.  It is okay to shower and let the water run over the incision but do not scrub the area or soak in a tub.  If you have a small amount of drainage you may place a dry bandage over the wound and change it daily.  If you experience a lot of drainage, develop redness around the wound, or a fever over 101 F occurs please call the office.      Medications:    Resume home medications as indicated on the Medical Reconciliation form.     Aspirin, Coumadin, and Plavix can be restarted on post operative day 2 if you were taking them preoperatively.      Pain medications:  Non steroidal antiinflammatories seem to work best for post surgical pain.  Try these first as prescribed.  A narcotic prescription will also be given for breakthrough pain.    Over the counter stool softeners and laxatives may be used if needed.    Do not hesitate to call with questions or concerns.

## 2025-05-08 NOTE — PLAN OF CARE
Problem: Pain  Goal: Verbalizes/displays adequate comfort level or baseline comfort level  5/8/2025 1958 by Hanna Cordova RN  Outcome: Progressing  Flowsheets (Taken 5/8/2025 1934)  Verbalizes/displays adequate comfort level or baseline comfort level:   Encourage patient to monitor pain and request assistance   Assess pain using appropriate pain scale   Administer analgesics based on type and severity of pain and evaluate response   Implement non-pharmacological measures as appropriate and evaluate response   Consider cultural and social influences on pain and pain management   Notify Licensed Independent Practitioner if interventions unsuccessful or patient reports new pain     Problem: Discharge Planning  Goal: Discharge to home or other facility with appropriate resources  5/8/2025 1958 by Hanna Cordova RN  Outcome: Progressing  Flowsheets (Taken 5/8/2025 1934)  Discharge to home or other facility with appropriate resources:   Identify barriers to discharge with patient and caregiver   Arrange for needed discharge resources and transportation as appropriate   Identify discharge learning needs (meds, wound care, etc)     Problem: Safety - Adult  Goal: Free from fall injury  Outcome: Progressing  Flowsheets (Taken 5/8/2025 1934)  Free From Fall Injury:   Instruct family/caregiver on patient safety   Based on caregiver fall risk screen, instruct family/caregiver to ask for assistance with transferring infant if caregiver noted to have fall risk factors

## 2025-05-09 VITALS
WEIGHT: 228.18 LBS | BODY MASS INDEX: 29.28 KG/M2 | HEIGHT: 74 IN | OXYGEN SATURATION: 94 % | RESPIRATION RATE: 16 BRPM | TEMPERATURE: 98.2 F | HEART RATE: 66 BPM | DIASTOLIC BLOOD PRESSURE: 75 MMHG | SYSTOLIC BLOOD PRESSURE: 126 MMHG

## 2025-05-09 LAB
ANION GAP SERPL CALC-SCNC: 5 MMOL/L (ref 2–12)
BUN SERPL-MCNC: 16 MG/DL (ref 6–20)
BUN/CREAT SERPL: 14 (ref 12–20)
CALCIUM SERPL-MCNC: 9 MG/DL (ref 8.5–10.1)
CHLORIDE SERPL-SCNC: 106 MMOL/L (ref 97–108)
CO2 SERPL-SCNC: 26 MMOL/L (ref 21–32)
CREAT SERPL-MCNC: 1.12 MG/DL (ref 0.7–1.3)
GLUCOSE SERPL-MCNC: 158 MG/DL (ref 65–100)
POTASSIUM SERPL-SCNC: 4 MMOL/L (ref 3.5–5.1)
SODIUM SERPL-SCNC: 137 MMOL/L (ref 136–145)

## 2025-05-09 PROCEDURE — 99024 POSTOP FOLLOW-UP VISIT: CPT | Performed by: NURSE PRACTITIONER

## 2025-05-09 PROCEDURE — 6370000000 HC RX 637 (ALT 250 FOR IP): Performed by: SURGERY

## 2025-05-09 PROCEDURE — 36415 COLL VENOUS BLD VENIPUNCTURE: CPT

## 2025-05-09 PROCEDURE — 80048 BASIC METABOLIC PNL TOTAL CA: CPT

## 2025-05-09 RX ADMIN — LOSARTAN POTASSIUM 25 MG: 25 TABLET, FILM COATED ORAL at 08:29

## 2025-05-09 RX ADMIN — METOPROLOL SUCCINATE 50 MG: 50 TABLET, EXTENDED RELEASE ORAL at 08:29

## 2025-05-09 RX ADMIN — AMLODIPINE BESYLATE 5 MG: 5 TABLET ORAL at 08:29

## 2025-05-09 RX ADMIN — ATORVASTATIN CALCIUM 10 MG: 10 TABLET, FILM COATED ORAL at 08:29

## 2025-05-09 ASSESSMENT — PAIN DESCRIPTION - LOCATION: LOCATION: HEAD

## 2025-05-09 ASSESSMENT — PAIN SCALES - GENERAL
PAINLEVEL_OUTOF10: 0
PAINLEVEL_OUTOF10: 1

## 2025-05-09 ASSESSMENT — PAIN DESCRIPTION - ORIENTATION: ORIENTATION: ANTERIOR

## 2025-05-09 ASSESSMENT — PAIN DESCRIPTION - FREQUENCY: FREQUENCY: INTERMITTENT

## 2025-05-09 ASSESSMENT — PAIN DESCRIPTION - ONSET: ONSET: PROGRESSIVE

## 2025-05-09 ASSESSMENT — PAIN DESCRIPTION - PAIN TYPE: TYPE: ACUTE PAIN

## 2025-05-09 ASSESSMENT — PAIN DESCRIPTION - DESCRIPTORS: DESCRIPTORS: THROBBING

## 2025-05-09 NOTE — PROGRESS NOTES
DISCHARGE NOTE FROM SURGICAL-TELEMETRY NURSE    Patient determined to be stable for discharge by attending provider. I have reviewed the discharge instructions and follow-up appointments with the Patient. They verbalized understanding and all questions were answered to their satisfaction. No complaints or further questions were expressed.      Medications sent to pharmacy Appropriate educational materials and medication side effect teaching were provided.      PIV were removed prior to discharge.     Patient did not discharge with any line, agosto, or drain.    All personal items collected during admission were returned to the patient prior to discharge.    Post-op patient: Yes - Patient given post-op discharge kit and instructed on use.      Sharri Guerrero RN

## 2025-05-09 NOTE — PROGRESS NOTES
End of Shift Note    Bedside shift change report given to Jesus, RN and NILSON Harrell (oncoming nurse) by Hanna Cordova RN (offgoing nurse).  Report included the following information SBAR, Procedure Summary, Intake/Output, MAR, and Recent Results    Shift worked:  2550-7914     Shift summary and any significant changes:     VSS, uneventful night. Dressing C/D/I, initially had numbness to toes, eventually resolved. No new numbness or tingling, serial calcium draws collected as ordered.  Refused bed alarm, but appropriately calls to go to BR. Voided spontaneously, no BM overnight. ICS initiated, pt able to do up to 2500. Complete linen change provided, declined CHG wipe down. Diet advanced to regular diet at midnight, tolerated so far, no N/V.      Concerns for physician to address:  Barrier for DC today, pt wants to go home.     Zone phone for oncoming shift:          Activity:  Level of Assistance: Standby assist, set-up cues, supervision of patient - no hands on  Number times ambulated in hallways past shift: 0  Number of times OOB to chair past shift: 5    Cardiac:   Cardiac Monitoring: No      Cardiac Rhythm: Sinus tachy    Access:  Current line(s): PIV    Genitourinary:   Urinary Status: Voiding, Bathroom privileges    Respiratory:   O2 Device: None (Room air)  Chronic home O2 use?: N/A  Incentive spirometer at bedside: YES    GI:  Last BM (including prior to admit): 05/07/25  Current diet:  ADULT DIET; Regular  Passing flatus: NO    Pain Management:   Patient states pain is manageable on current regimen: N/A    Skin:  Bear Scale Score: 20  Interventions: Wound Offloading (Prevention Methods): Bed, pressure reduction mattress    Patient Safety:  Fall Risk: Nursing Judgement-Fall Risk High(Add Comments): No  Fall Risk Interventions  Nursing Judgement-Fall Risk High(Add Comments): No  Toilet Every 2 Hours-In Advance of Need: Yes  Hourly Visual Checks: In bed, Awake  Fall Visual Posted: Fall sign posted,

## 2025-05-09 NOTE — PROGRESS NOTES
Surgery NP Progress Note    Kieran Chiu  [id number]  male  60 y.o.  1965    s/p TOTAL THYROIDECTOMY on 2025    Pt seen with Dr. Treadwell    Assessment:   Principal Problem:    Goiter  Active Problems:    Multinodular goiter  Resolved Problems:    * No resolved hospital problems. *      Expected post-op progress.     Plan/Recommendations/Medical Decision Making:     - Mobilize with nursing and OOB to chair for meals  - Continue diet  - Pain management- Continue current pain control methods.   - D/C home     Subjective:     Patient with some hoarseness but no stridor, no discomfort or difficulty swallowing. No numbness/tingling.     Objective:     Blood pressure 126/75, pulse 66, temperature 98.2 °F (36.8 °C), resp. rate 16, height 1.88 m (6' 2\"), weight 103.5 kg (228 lb 2.8 oz), SpO2 94%.    Temp (24hrs), Av.4 °F (36.9 °C), Min:98.1 °F (36.7 °C), Max:98.7 °F (37.1 °C)      Pt resting in bed NAD   Incisions CDI.    SCDs for mechanical DVT proph while in bed     Body mass index is 29.3 kg/m².     Reference: BMI greater than 30 is classified as obesity and greater than 40 is classified as morbid obesity.       JESSICA Sibley - NP   MSN, APRN, FNP-C, CWOCN-AP, RNAS-C    25

## 2025-05-16 ENCOUNTER — TELEPHONE (OUTPATIENT)
Age: 60
End: 2025-05-16

## 2025-05-16 NOTE — TELEPHONE ENCOUNTER
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

## 2025-05-20 ENCOUNTER — TELEPHONE (OUTPATIENT)
Age: 60
End: 2025-05-20

## 2025-05-20 NOTE — TELEPHONE ENCOUNTER
Informed patient per  note after she spoke with Dr. Cisneros patient was to see an endocrinologist re his levothyroxine dosing. Patient canceled appointment because he did not know why he needed to see what he thought was a diabetic MD. Tried to explain patient hung up.

## 2025-05-20 NOTE — TELEPHONE ENCOUNTER
Patient has concerns as to why md referred him to a diabetic dr, he has canceled that appt and would like a call back.      [phone]

## 2025-05-23 ENCOUNTER — OFFICE VISIT (OUTPATIENT)
Age: 60
End: 2025-05-23

## 2025-05-23 VITALS
RESPIRATION RATE: 18 BRPM | DIASTOLIC BLOOD PRESSURE: 86 MMHG | BODY MASS INDEX: 29.7 KG/M2 | SYSTOLIC BLOOD PRESSURE: 148 MMHG | HEART RATE: 80 BPM | HEIGHT: 74 IN | TEMPERATURE: 98.2 F | WEIGHT: 231.4 LBS | OXYGEN SATURATION: 96 %

## 2025-05-23 DIAGNOSIS — C73 THYROID CA (HCC): Primary | ICD-10-CM

## 2025-05-23 DIAGNOSIS — J38.00 RECURRENT LARYNGEAL NERVE PARALYSIS: ICD-10-CM

## 2025-05-23 DIAGNOSIS — C73 THYROID CA (HCC): ICD-10-CM

## 2025-05-23 PROCEDURE — 99024 POSTOP FOLLOW-UP VISIT: CPT | Performed by: SURGERY

## 2025-05-23 NOTE — PROGRESS NOTES
Identified pt with two pt identifiers (name and ). Reviewed chart in preparation for visit and have obtained necessary documentation.    Kieran Chiu is a 60 y.o. male  Chief Complaint   Patient presents with    Post-Op Check     po 2wk total thyroidectomy      BP (!) 148/86 (BP Site: Left Upper Arm, Patient Position: Sitting, BP Cuff Size: Large Adult) Comment: Patient reports medication not taken yet, due in approx 30 min  Pulse 80   Temp 98.2 °F (36.8 °C) (Oral)   Resp 18   Ht 1.88 m (6' 2\")   Wt 105 kg (231 lb 6.4 oz)   SpO2 96%   BMI 29.71 kg/m²     1. Have you been to the ER, urgent care clinic since your last visit?  Hospitalized since your last visit?no    2. Have you seen or consulted any other health care providers outside of the [facility] since your last visit?  Include any pap smears or colon screening. No    Elevated blood pressure, patient reports meds due at 10:30 AM daily

## 2025-05-23 NOTE — PROGRESS NOTES
Surgery  Follow up    Procedure: Total thyroidectomy and repair of right recurrent laryngeal nerve  OR date:  5/8/2025  Path:    FINAL PATHOLOGIC DIAGNOSIS          Thyroid; total thyroidectomy:        Right thyroid lobe: Follicular variant of papillary carcinoma,   well-demarcated, angioinvasive.   Left thyroid lobe: Small focus of papillary thyroid carcinoma, classic   subtype .   Please see synoptic report.       SYNOPTIC REPORT:   THYROID GLAND   SPECIMEN       Procedure: Total thyroidectomy   TUMOR       Tumor Focality: Multifocal   Tumor Characteristics        Tumor Identifier: #1   Tumor Site:  Right lobe   Tumor Size: Greatest Dimension (Centimeters) - 10.5 x 7.0 x 5.0 cm   Histologic Tumor Types and Subtypes: Well-demarcated angioinvasive   follicular variant papillary carcinoma        Tumor Proliferative Activity        Mitotic Rate:  Greater than or equal to 3 but less than 5 mitoses per   2 mm2        Tumor Necrosis:  Not identified   Angioinvasion (vascular invasion):  Present, extent not specified   Lymphatic Invasion:  Not identified   Extrathyroidal Extension:  Not identified   Margin Status:  All margins negative for carcinoma        Tumor Characteristics        Tumor Identifier:  #2   Tumor Site:  Left lobe   Tumor Size:  Greatest Dimension (Centimeters) - 0.2 cm   Histologic Tumor Types and Subtypes:  Papillary carcinoma, classic subtype        Tumor Proliferative Activity        Mitotic Rate: Less than 3 mitoses per 2mm2        Tumor Necrosis:  Not identified   Angioinvasion (vascular invasion): Not identified   Lymphatic Invasion:  Not identified   Extrathyroidal Extension:  Not identified   Margin Status:  All margins negative for carcinoma   REGIONAL LYMPH NODES        Regional Lymph Node Status:  Not applicable (no regional lymph nodes   submitted or found)   pTNM CLASSIFICATION (AJCC 8th Edition)   Reporting of pT, pN, and (when applicable) pM categories is based on   information available

## 2025-05-28 ENCOUNTER — TELEPHONE (OUTPATIENT)
Age: 60
End: 2025-05-28

## 2025-05-28 NOTE — TELEPHONE ENCOUNTER
Pt informed it was not that long ago that referrals were put in.  I re-faxed referrals and suggested Pt contact specialists directly to make appointments.  Pt has hard copy of referrals with numbers.

## 2025-05-28 NOTE — TELEPHONE ENCOUNTER
Patient would like a nurse to reach out to him he has not heard back from referral offices, he is unable to talk and must whisper.      [phone]

## 2025-06-02 ENCOUNTER — TELEPHONE (OUTPATIENT)
Age: 60
End: 2025-06-02

## 2025-06-02 RX ORDER — LEVOTHYROXINE SODIUM 150 UG/1
150 TABLET ORAL DAILY
Qty: 30 TABLET | Refills: 0 | Status: SHIPPED | OUTPATIENT
Start: 2025-06-02

## 2025-06-02 NOTE — TELEPHONE ENCOUNTER
Patient called needing a refill on his thyroid medication, patient states he only has 6 tablets left       [phone]

## 2025-06-17 LAB
T4 FREE SERPL-MCNC: 1.92 NG/DL (ref 0.82–1.77)
TSH SERPL DL<=0.005 MIU/L-ACNC: 0.04 UIU/ML (ref 0.45–4.5)

## 2025-06-25 LAB
THYROGLOB AB SERPL-ACNC: 1.6 IU/ML
THYROGLOB SERPL-MCNC: <2 NG/ML
THYROGLOB SERPL-MCNC: ABNORMAL NG/ML

## 2025-07-02 ENCOUNTER — TELEPHONE (OUTPATIENT)
Age: 60
End: 2025-07-02

## 2025-07-02 RX ORDER — LEVOTHYROXINE SODIUM 150 UG/1
150 TABLET ORAL DAILY
Qty: 30 TABLET | Refills: 0 | Status: SHIPPED | OUTPATIENT
Start: 2025-07-02

## 2025-07-07 ENCOUNTER — OFFICE VISIT (OUTPATIENT)
Age: 60
End: 2025-07-07

## 2025-07-07 VITALS
HEIGHT: 74 IN | WEIGHT: 237.2 LBS | TEMPERATURE: 98 F | BODY MASS INDEX: 30.44 KG/M2 | OXYGEN SATURATION: 93 % | HEART RATE: 72 BPM | RESPIRATION RATE: 12 BRPM | SYSTOLIC BLOOD PRESSURE: 138 MMHG | DIASTOLIC BLOOD PRESSURE: 85 MMHG

## 2025-07-07 DIAGNOSIS — J38.00 RECURRENT LARYNGEAL NERVE PARALYSIS: ICD-10-CM

## 2025-07-07 DIAGNOSIS — C73 THYROID CA (HCC): Primary | ICD-10-CM

## 2025-07-07 PROCEDURE — 99024 POSTOP FOLLOW-UP VISIT: CPT | Performed by: SURGERY

## 2025-07-07 ASSESSMENT — PATIENT HEALTH QUESTIONNAIRE - PHQ9
2. FEELING DOWN, DEPRESSED OR HOPELESS: NOT AT ALL
SUM OF ALL RESPONSES TO PHQ QUESTIONS 1-9: 0
1. LITTLE INTEREST OR PLEASURE IN DOING THINGS: NOT AT ALL
SUM OF ALL RESPONSES TO PHQ QUESTIONS 1-9: 0

## 2025-07-07 NOTE — PROGRESS NOTES
Identified pt with two pt identifiers (name and ). Reviewed chart in preparation for visit and have obtained necessary documentation.    Kieran Chiu is a 60 y.o. male Post-Op Check (S/p TOTAL THYROIDECTOMY on 2025)  .    Vitals:    25 0939   BP: 138/85   BP Site: Left Upper Arm   Patient Position: Sitting   Pulse: 72   Resp: 12   Temp: 98 °F (36.7 °C)   TempSrc: Oral   SpO2: 93%   Weight: 107.6 kg (237 lb 3.2 oz)   Height: 1.88 m (6' 2\")          1. Have you been to the ER, urgent care clinic since your last visit?  Hospitalized since your last visit?  no     2. Have you seen or consulted any other health care providers outside of the [facility] since your last visit?  Include any pap smears or colon screening.  no

## 2025-07-15 ENCOUNTER — OFFICE VISIT (OUTPATIENT)
Age: 60
End: 2025-07-15
Payer: COMMERCIAL

## 2025-07-15 ENCOUNTER — TELEPHONE (OUTPATIENT)
Age: 60
End: 2025-07-15

## 2025-07-15 VITALS
SYSTOLIC BLOOD PRESSURE: 120 MMHG | DIASTOLIC BLOOD PRESSURE: 76 MMHG | HEIGHT: 74 IN | WEIGHT: 235.2 LBS | HEART RATE: 82 BPM | BODY MASS INDEX: 30.18 KG/M2

## 2025-07-15 DIAGNOSIS — E89.0 POSTOPERATIVE HYPOTHYROIDISM: ICD-10-CM

## 2025-07-15 DIAGNOSIS — C73 THYROID CANCER (HCC): Primary | ICD-10-CM

## 2025-07-15 PROCEDURE — 99205 OFFICE O/P NEW HI 60 MIN: CPT | Performed by: INTERNAL MEDICINE

## 2025-07-15 PROCEDURE — 3078F DIAST BP <80 MM HG: CPT | Performed by: INTERNAL MEDICINE

## 2025-07-15 PROCEDURE — 3074F SYST BP LT 130 MM HG: CPT | Performed by: INTERNAL MEDICINE

## 2025-07-15 RX ORDER — LEVOTHYROXINE SODIUM 125 UG/1
125 TABLET ORAL DAILY
Qty: 90 TABLET | Refills: 1 | Status: SHIPPED | OUTPATIENT
Start: 2025-07-15

## 2025-07-15 NOTE — TELEPHONE ENCOUNTER
Thyrogen, ABBOTT and WBS have been scheduled. Advised patient will mail the dates/times/prep to his address. Patient expressed understanding.

## 2025-07-15 NOTE — PROGRESS NOTES
Sabianism Services: Never     Active Member of Clubs or Organizations: No     Attends Club or Organization Meetings: Never     Marital Status: Never    Intimate Partner Violence: Not At Risk (10/4/2024)    Humiliation, Afraid, Rape, and Kick questionnaire     Fear of Current or Ex-Partner: No     Emotionally Abused: No     Physically Abused: No     Sexually Abused: No   Housing Stability: Low Risk  (5/8/2025)    Housing Stability Vital Sign     Unable to Pay for Housing in the Last Year: No     Number of Times Moved in the Last Year: 0     Homeless in the Last Year: No     Review of Systems:  Constitutional Symptoms: no fevers, chills, weight loss  Eyes: no blurry vision or double vision  Cardiovascular: no chest pain or palpitations  Respiratory: no cough or shortness of breath  Gastrointestinal: no dysphagia or abdominal pain  Musculoskeletal: no joint pains or weakness  Integumentary: no rashes  Neurological: no numbness, tingling, or headaches  Psychiatric: no depression or anxiety  Endocrine: no heat or cold intolerance, no polyuria or polydipsia    Physical Examination:  Blood pressure 120/76, pulse 82, height 1.88 m (6' 2\"), weight 106.7 kg (235 lb 3.2 oz).  General: pleasant, no distress, good eye contact  HEENT: no exopthalmos, no periorbital edema, no scleral/conjunctival injection, EOMI, no lid lag or stare  Neck: supple, no thyromegaly, masses, lymph nodes, or carotid bruits, no supraclavicular or dorsocervical fat pads  Cardiovascular: regular, normal rate, normal S1 and S2, no murmurs/rubs/gallops   Respiratory: clear to auscultation bilaterally  Gastrointestinal: soft, nontender, nondistended, no masses, no hepatosplenomegaly  Musculoskeletal: no proximal muscle weakness in upper or lower extremities  Integumentary: no acanthosis nigricans, no abdominal striae, no rashes, no edema  Neurological: reflexes 2+ at biceps, no tremor  Psychiatric: normal mood and affect    Data Reviewed:

## 2025-07-18 RX ORDER — SODIUM CHLORIDE 9 MG/ML
INJECTION, SOLUTION INTRAVENOUS PRN
OUTPATIENT
Start: 2025-07-28

## 2025-07-18 RX ORDER — HYDROCORTISONE SODIUM SUCCINATE 100 MG/2ML
100 INJECTION INTRAMUSCULAR; INTRAVENOUS
OUTPATIENT
Start: 2025-07-28

## 2025-07-18 RX ORDER — EPINEPHRINE 1 MG/ML
0.3 INJECTION, SOLUTION, CONCENTRATE INTRAVENOUS PRN
OUTPATIENT
Start: 2025-07-28

## 2025-07-18 RX ORDER — ONDANSETRON 2 MG/ML
8 INJECTION INTRAMUSCULAR; INTRAVENOUS
OUTPATIENT
Start: 2025-07-28

## 2025-07-18 RX ORDER — ACETAMINOPHEN 325 MG/1
650 TABLET ORAL
OUTPATIENT
Start: 2025-07-28

## 2025-07-18 RX ORDER — DIPHENHYDRAMINE HYDROCHLORIDE 50 MG/ML
50 INJECTION, SOLUTION INTRAMUSCULAR; INTRAVENOUS
OUTPATIENT
Start: 2025-07-28

## 2025-07-18 RX ORDER — FAMOTIDINE 10 MG/ML
20 INJECTION, SOLUTION INTRAVENOUS
OUTPATIENT
Start: 2025-07-28

## 2025-07-18 RX ORDER — ALBUTEROL SULFATE 90 UG/1
4 INHALANT RESPIRATORY (INHALATION) PRN
OUTPATIENT
Start: 2025-07-28

## 2025-07-28 ENCOUNTER — HOSPITAL ENCOUNTER (OUTPATIENT)
Facility: HOSPITAL | Age: 60
Setting detail: INFUSION SERIES
Discharge: HOME | End: 2025-07-28
Payer: COMMERCIAL

## 2025-07-28 VITALS
HEART RATE: 97 BPM | TEMPERATURE: 98.6 F | OXYGEN SATURATION: 95 % | DIASTOLIC BLOOD PRESSURE: 90 MMHG | RESPIRATION RATE: 16 BRPM | SYSTOLIC BLOOD PRESSURE: 143 MMHG

## 2025-07-28 DIAGNOSIS — C73 THYROID CA (HCC): Primary | ICD-10-CM

## 2025-07-28 PROCEDURE — 96372 THER/PROPH/DIAG INJ SC/IM: CPT

## 2025-07-28 PROCEDURE — 6360000002 HC RX W HCPCS: Performed by: INTERNAL MEDICINE

## 2025-07-28 PROCEDURE — 2500000003 HC RX 250 WO HCPCS: Performed by: INTERNAL MEDICINE

## 2025-07-28 RX ORDER — ONDANSETRON 2 MG/ML
8 INJECTION INTRAMUSCULAR; INTRAVENOUS
OUTPATIENT
Start: 2025-07-29

## 2025-07-28 RX ORDER — SODIUM CHLORIDE 9 MG/ML
INJECTION, SOLUTION INTRAVENOUS PRN
OUTPATIENT
Start: 2025-07-29

## 2025-07-28 RX ORDER — ALBUTEROL SULFATE 90 UG/1
4 INHALANT RESPIRATORY (INHALATION) PRN
OUTPATIENT
Start: 2025-07-29

## 2025-07-28 RX ORDER — EPINEPHRINE 1 MG/ML
0.3 INJECTION, SOLUTION INTRAMUSCULAR; SUBCUTANEOUS PRN
OUTPATIENT
Start: 2025-07-29

## 2025-07-28 RX ORDER — HYDROCORTISONE SODIUM SUCCINATE 100 MG/2ML
100 INJECTION INTRAMUSCULAR; INTRAVENOUS
OUTPATIENT
Start: 2025-07-29

## 2025-07-28 RX ORDER — DIPHENHYDRAMINE HYDROCHLORIDE 50 MG/ML
50 INJECTION, SOLUTION INTRAMUSCULAR; INTRAVENOUS
OUTPATIENT
Start: 2025-07-29

## 2025-07-28 RX ORDER — ACETAMINOPHEN 325 MG/1
650 TABLET ORAL
OUTPATIENT
Start: 2025-07-29

## 2025-07-28 RX ADMIN — THYROTROPIN ALFA 0.9 MG: 0.9 INJECTION, POWDER, FOR SOLUTION INTRAMUSCULAR at 08:53

## 2025-07-28 ASSESSMENT — PAIN DESCRIPTION - LOCATION: LOCATION: GENERALIZED

## 2025-07-28 ASSESSMENT — PAIN SCALES - GENERAL: PAINLEVEL_OUTOF10: 2

## 2025-07-28 NOTE — PROGRESS NOTES
[facility] Visit Note    0845 Pt arrived to [facility] ambulatory and in no distress for Thyrogen 1/2.  Assessment completed, no new complaints voiced.       BP (!) 143/90 Comment: Pt due to take BP medication in 2 hrs  Pulse 97   Temp 98.6 °F (37 °C) (Temporal)   Resp 16   SpO2 95%        Medications received:  Medications Administered         thyrotropin olayinka (THYROGEN) 0.9 mg in sterile water 1 mL injection Admin Date  07/28/2025 Action  Given Dose  0.9 mg Route  IntraMUSCular Documented By  Megan Santiago, RN             0844 Tolerated treatment well, no adverse reaction noted.  D/Cd from [facility] ambulatory and in no distress accompanied by self.  Next appt 7/29 for 2/2.

## 2025-07-29 ENCOUNTER — HOSPITAL ENCOUNTER (OUTPATIENT)
Facility: HOSPITAL | Age: 60
Setting detail: INFUSION SERIES
Discharge: HOME | End: 2025-07-29
Payer: COMMERCIAL

## 2025-07-29 VITALS
TEMPERATURE: 98.7 F | OXYGEN SATURATION: 96 % | RESPIRATION RATE: 16 BRPM | HEART RATE: 77 BPM | DIASTOLIC BLOOD PRESSURE: 98 MMHG | SYSTOLIC BLOOD PRESSURE: 163 MMHG

## 2025-07-29 DIAGNOSIS — C73 THYROID CA (HCC): Primary | ICD-10-CM

## 2025-07-29 PROCEDURE — 2500000003 HC RX 250 WO HCPCS: Performed by: INTERNAL MEDICINE

## 2025-07-29 PROCEDURE — 96372 THER/PROPH/DIAG INJ SC/IM: CPT

## 2025-07-29 PROCEDURE — 6360000002 HC RX W HCPCS: Performed by: INTERNAL MEDICINE

## 2025-07-29 RX ORDER — ALBUTEROL SULFATE 90 UG/1
4 INHALANT RESPIRATORY (INHALATION) PRN
OUTPATIENT
Start: 2025-07-29

## 2025-07-29 RX ORDER — ACETAMINOPHEN 325 MG/1
650 TABLET ORAL
OUTPATIENT
Start: 2025-07-29

## 2025-07-29 RX ORDER — DIPHENHYDRAMINE HYDROCHLORIDE 50 MG/ML
50 INJECTION, SOLUTION INTRAMUSCULAR; INTRAVENOUS
OUTPATIENT
Start: 2025-07-29

## 2025-07-29 RX ORDER — SODIUM CHLORIDE 9 MG/ML
INJECTION, SOLUTION INTRAVENOUS PRN
OUTPATIENT
Start: 2025-07-29

## 2025-07-29 RX ORDER — HYDROCORTISONE SODIUM SUCCINATE 100 MG/2ML
100 INJECTION INTRAMUSCULAR; INTRAVENOUS
OUTPATIENT
Start: 2025-07-29

## 2025-07-29 RX ORDER — ONDANSETRON 2 MG/ML
8 INJECTION INTRAMUSCULAR; INTRAVENOUS
OUTPATIENT
Start: 2025-07-29

## 2025-07-29 RX ORDER — EPINEPHRINE 1 MG/ML
0.3 INJECTION, SOLUTION INTRAMUSCULAR; SUBCUTANEOUS PRN
OUTPATIENT
Start: 2025-07-29

## 2025-07-29 RX ADMIN — WATER 0.9 MG: 1 INJECTION INTRAMUSCULAR; INTRAVENOUS; SUBCUTANEOUS at 09:07

## 2025-07-29 ASSESSMENT — PAIN SCALES - GENERAL: PAINLEVEL_OUTOF10: 0

## 2025-07-29 NOTE — PROGRESS NOTES
[facility] Progress Note      Date: 2025    Name: Kieran Chiu    MRN: [id number]         : 1965    0900 Patient arrives for Thyrogen / without acute problems. Please see Epic for complete assessment and education provided.    Vital signs stable throughout and prior to discharge. Patient tolerated procedure well and was discharged without incident. Patient is aware of no further [facility] appointments and to follow up with referring provider for any questions or concerns.     Mr. Chiu's vitals were reviewed prior to and after treatment.   Patient Vitals for the past 12 hrs:   Temp Pulse Resp BP SpO2   25 0900 98.7 °F (37.1 °C) 77 16 (!) 163/98 96 %       Medications given:   Medications Administered         thyrotropin olayinka (THYROGEN) 0.9 mg in sterile water 1 mL injection Admin Date  2025 Action  Given Dose  0.9 mg Route  IntraMUSCular Documented By  Rodrigo Ritter, RN            Mr. Chiu tolerated the injection, and had no complaints.    [facility] in [facility] condition. Discharge Instructions provided to patient, patient verbalized understanding but denied the request for a copy of after visit summary.     Future Appointments   Date Time Provider Department Center   2025 11:30 AM [facility] CONSULT 1 [facility]   2025  1:30 PM [facility] INJ RM 1 [facility]   2025  2:00 PM [facility] RM 3 [facility]   2025  8:45 AM [facility]   10/7/2025  8:00 AM Akash Treadwell MD [facility] BS AMB   2025  1:30 PM Mark Suarez MD RDE [facility] BS AMB       RODRIGO RITTER RN  2025  10:25 AM

## 2025-07-30 ENCOUNTER — HOSPITAL ENCOUNTER (OUTPATIENT)
Facility: HOSPITAL | Age: 60
Discharge: HOME | End: 2025-08-02
Attending: INTERNAL MEDICINE
Payer: COMMERCIAL

## 2025-07-30 DIAGNOSIS — C73 THYROID CANCER (HCC): ICD-10-CM

## 2025-07-30 PROCEDURE — A9517 I131 IODIDE CAP, RX: HCPCS | Performed by: INTERNAL MEDICINE

## 2025-07-30 PROCEDURE — 79005 NUCLEAR RX ORAL ADMIN: CPT

## 2025-07-30 PROCEDURE — 3430000000 HC RX DIAGNOSTIC RADIOPHARMACEUTICAL: Performed by: INTERNAL MEDICINE

## 2025-07-30 RX ADMIN — SODIUM IODIDE I 131 52500 MICRO CURIE: 1 CAPSULE, GELATIN COATED ORAL at 14:57

## 2025-08-01 ENCOUNTER — TELEPHONE (OUTPATIENT)
Age: 60
End: 2025-08-01

## 2025-08-06 ENCOUNTER — HOSPITAL ENCOUNTER (OUTPATIENT)
Facility: HOSPITAL | Age: 60
Discharge: HOME | End: 2025-08-09
Attending: INTERNAL MEDICINE
Payer: COMMERCIAL

## 2025-08-06 DIAGNOSIS — C73 THYROID CANCER (HCC): ICD-10-CM

## 2025-08-06 PROCEDURE — 78018 THYROID MET IMAGING BODY: CPT

## (undated) DEVICE — BLADE ES ELASTOMERIC COAT INSUL DURABLE BEND UPTO 90DEG

## (undated) DEVICE — PROBE 8225101 5PK STD PRASS FL TIP ROHS

## (undated) DEVICE — GLOVE ORANGE PI 7 1/2   MSG9075

## (undated) DEVICE — GOWN,SIRUS,NONRNF,SETINSLV,2XL,18/CS: Brand: MEDLINE

## (undated) DEVICE — AGENT HEMOSTATIC SURG ORIGINAL ABS 4X8IN LOOSE KNIT 12/CA

## (undated) DEVICE — KIT,1200CC CANISTER,3/16"X6' TUBING: Brand: MEDLINE INDUSTRIES, INC.

## (undated) DEVICE — SEALER LAP SM L18.8CM OPN JAW HAND/FOOT SWCH FORCETRIAD

## (undated) DEVICE — SUTURE PERMAHAND SZ 2-0 L30IN NONABSORBABLE BLK SILK W/O A305H

## (undated) DEVICE — SUTURE PERMA-HAND SZ 2-0 L30IN NONABSORBABLE BLK L26MM SH K833H

## (undated) DEVICE — SUTURE VICRYL SZ 4-0 L18IN ABSRB UD L13MM P-3 3/8 CIR PRIM J494H

## (undated) DEVICE — SYRINGE MED 10ML LUERLOCK TIP W/O SFTY DISP

## (undated) DEVICE — CLIP LIG M BLU TI HRT SHP WIRE HORZ 24 CLIPS/PK 25PK/CA

## (undated) DEVICE — PENCIL SMK EVAC ALL IN 1 DSGN ENH VISIBILITY IMPROVED AIR

## (undated) DEVICE — SOLUTION IRRIG 1000ML 09% SOD CHL USP PIC PLAS CONTAINER

## (undated) DEVICE — SUTURE VICRYL SZ 2-0 L27IN ABSRB UD L26MM SH 1/2 CIR J417H

## (undated) DEVICE — PAD,NON-ADHERENT,3X8,STERILE,LF,1/PK: Brand: MEDLINE

## (undated) DEVICE — APPLICATOR MEDICATED 10.5 CC SOLUTION HI LT ORNG CHLORAPREP

## (undated) DEVICE — SHEET,T,THYROID,STERILE: Brand: MEDLINE

## (undated) DEVICE — MAGNETIC INSTR DRAPE 20X16: Brand: MEDLINE INDUSTRIES, INC.

## (undated) DEVICE — MAJOR LAPAROTOMY-MRMC: Brand: MEDLINE INDUSTRIES, INC.

## (undated) DEVICE — SPONGE GZ W4XL4IN COT RADPQ HIGHLY ABSRB STERILE

## (undated) DEVICE — SUTURE VICRYL + SZ 3-0 L27IN ABSRB UD L26MM SH 1/2 CIR VCP416H

## (undated) DEVICE — LIQUIBAND RAPID ADHESIVE 36/CS 0.8ML: Brand: MEDLINE

## (undated) DEVICE — HYPODERMIC SAFETY NEEDLE: Brand: MONOJECT

## (undated) DEVICE — SUTURE PROL SZ 6-0 L18IN NONABSORBABLE BLU L9.3MM BV-1 3/8 8806H